# Patient Record
Sex: FEMALE | Race: WHITE | NOT HISPANIC OR LATINO | Employment: OTHER | URBAN - METROPOLITAN AREA
[De-identification: names, ages, dates, MRNs, and addresses within clinical notes are randomized per-mention and may not be internally consistent; named-entity substitution may affect disease eponyms.]

---

## 2021-03-03 ENCOUNTER — TRANSCRIBE ORDERS (OUTPATIENT)
Dept: NEUROSURGERY | Facility: CLINIC | Age: 76
End: 2021-03-03

## 2021-03-03 DIAGNOSIS — G89.4 CHRONIC PAIN SYNDROME: Primary | ICD-10-CM

## 2021-03-05 ENCOUNTER — TELEPHONE (OUTPATIENT)
Dept: NEUROSURGERY | Facility: CLINIC | Age: 76
End: 2021-03-05

## 2021-03-05 NOTE — TELEPHONE ENCOUNTER
Patient called nurseline with questions regarding her upcoming appt  Returned call to patient and verified date, time and location of next appt  Patient appreciative of call back

## 2021-03-29 ENCOUNTER — CONSULT (OUTPATIENT)
Dept: NEUROSURGERY | Facility: CLINIC | Age: 76
End: 2021-03-29
Payer: MEDICARE

## 2021-03-29 ENCOUNTER — TELEPHONE (OUTPATIENT)
Dept: NEUROSURGERY | Facility: CLINIC | Age: 76
End: 2021-03-29

## 2021-03-29 VITALS
DIASTOLIC BLOOD PRESSURE: 58 MMHG | HEIGHT: 62 IN | SYSTOLIC BLOOD PRESSURE: 112 MMHG | WEIGHT: 145 LBS | BODY MASS INDEX: 26.68 KG/M2 | TEMPERATURE: 97.6 F | RESPIRATION RATE: 16 BRPM | HEART RATE: 76 BPM

## 2021-03-29 DIAGNOSIS — G89.4 CHRONIC PAIN SYNDROME: Primary | ICD-10-CM

## 2021-03-29 DIAGNOSIS — M96.1 POSTLAMINECTOMY SYNDROME: ICD-10-CM

## 2021-03-29 DIAGNOSIS — M54.16 LUMBAR RADICULOPATHY: ICD-10-CM

## 2021-03-29 PROCEDURE — 99204 OFFICE O/P NEW MOD 45 MIN: CPT | Performed by: NEUROLOGICAL SURGERY

## 2021-03-29 RX ORDER — ATORVASTATIN CALCIUM 80 MG/1
TABLET, FILM COATED ORAL
COMMUNITY
Start: 2020-10-02

## 2021-03-29 RX ORDER — OXYCODONE HYDROCHLORIDE 10 MG/1
TABLET ORAL
COMMUNITY
End: 2021-04-07 | Stop reason: ALTCHOICE

## 2021-03-29 RX ORDER — TRAZODONE HYDROCHLORIDE 50 MG/1
50 TABLET ORAL
COMMUNITY
Start: 2021-03-05 | End: 2021-05-04

## 2021-03-29 RX ORDER — EZETIMIBE 10 MG/1
TABLET ORAL
COMMUNITY
Start: 2020-10-09

## 2021-03-29 RX ORDER — GABAPENTIN 800 MG/1
800 TABLET ORAL 3 TIMES DAILY
COMMUNITY
Start: 2021-01-20

## 2021-03-29 RX ORDER — HYDROCODONE BITARTRATE AND ACETAMINOPHEN 5; 325 MG/1; MG/1
1-2 TABLET ORAL EVERY 6 HOURS PRN
COMMUNITY
Start: 2021-02-03 | End: 2021-04-07 | Stop reason: ALTCHOICE

## 2021-03-29 RX ORDER — NITROGLYCERIN 0.4 MG/1
TABLET SUBLINGUAL
COMMUNITY

## 2021-03-29 RX ORDER — ZOLPIDEM TARTRATE 10 MG/1
10 TABLET ORAL
COMMUNITY

## 2021-03-29 RX ORDER — COVID-19 ANTIGEN TEST
220 KIT MISCELLANEOUS
COMMUNITY
End: 2021-04-20 | Stop reason: HOSPADM

## 2021-03-29 RX ORDER — CLOPIDOGREL BISULFATE 75 MG/1
TABLET ORAL
COMMUNITY
End: 2021-04-07 | Stop reason: ALTCHOICE

## 2021-03-29 RX ORDER — CHLORHEXIDINE GLUCONATE 0.12 MG/ML
15 RINSE ORAL ONCE
Status: CANCELLED | OUTPATIENT
Start: 2021-03-29 | End: 2021-03-29

## 2021-03-29 RX ORDER — CEFAZOLIN SODIUM 2 G/50ML
2000 SOLUTION INTRAVENOUS ONCE
Status: CANCELLED | OUTPATIENT
Start: 2021-04-20 | End: 2021-03-29

## 2021-03-29 RX ORDER — PAROXETINE 30 MG/1
30 TABLET, FILM COATED ORAL DAILY
COMMUNITY
Start: 2021-02-17

## 2021-03-29 RX ORDER — LANOLIN ALCOHOL/MO/W.PET/CERES
CREAM (GRAM) TOPICAL
COMMUNITY

## 2021-03-29 RX ORDER — WARFARIN SODIUM 5 MG/1
TABLET ORAL
COMMUNITY
Start: 2021-02-17 | End: 2021-04-20 | Stop reason: HOSPADM

## 2021-03-29 RX ORDER — DOCUSATE SODIUM 100 MG/1
CAPSULE, LIQUID FILLED ORAL
COMMUNITY
End: 2021-04-07 | Stop reason: ALTCHOICE

## 2021-03-29 NOTE — H&P (VIEW-ONLY)
Assessment/Plan:    No problem-specific Assessment & Plan notes found for this encounter  Patient is stable  Symptoms, as detailed in HPI, continue to significantly impact of patient's quality of life in daily activities  After carefully considering presentation, investigations, functional status and co-morbidities, the risk/benefit profile of surgical intervention is favorable  History, physical examination and diagnostic tests were reviewed and questions answered  Diagnosis, care plan and treatment options were discussed  The patient understand instructions and will follow up as directed  Patient with chronic low back pain  She had a successful trial with > 50% relief of chronic low back pain  She wishes to proceed with implantation  Expected postoperative course, including activity restrictions, expected pain and postoperative medication were reviewed  Patient provided verbal consent to surgical procedure and signed consent form: Yes    We also discussed the risks and benefits of the procedure the risks being including but not limited too: infection (~2%), neurologic injury (<1%), new pain, revisions surgery, failure to relieve pain, hardware issues  The benefits including relief of pain as in trial  The patient stated understanding of the risks and benefits and agreed to proceed          Diagnoses and all orders for this visit:    Chronic pain syndrome  -     Ambulatory referral to Neurosurgery  -     Case request operating room: [address], LEFT FLANK; Standing  -     Case request operating room: INSERTION THORACIC DORSAL COLUMN SPINAL CORD STIMULATOR PERCUTANEOUS W IMPLANTABLE PULSE GENERATOR, LEFT FLANK  -     MRI thoracic spine without contrast; Future    Postlaminectomy syndrome  -     [facility], [facility]; Standing  -     Case request operating room: INSERTION THORACIC DORSAL COLUMN SPINAL CORD STIMULATOR PERCUTANEOUS W IMPLANTABLE PULSE GENERATOR, LEFT FLANK  -     MRI thoracic spine without contrast; Future    Lumbar radiculopathy  -     Case request operating room: INSERTION THORACIC DORSAL COLUMN SPINAL CORD STIMULATOR PERCUTANEOUS W IMPLANTABLE PULSE GENERATOR, LEFT FLANK; Standing  -     Case request operating room: INSERTION THORACIC DORSAL COLUMN SPINAL CORD STIMULATOR PERCUTANEOUS W IMPLANTABLE PULSE GENERATOR, LEFT FLANK  -     MRI thoracic spine without contrast; Future    Other orders  -     atorvastatin (LIPITOR) 80 mg tablet; TAKE 1 TABLET NIGHTLY  -     calcium citrate-vitamin D (CITRACAL+D) 315-200 MG-UNIT per tablet; 1 tablet 2x daily  -     clopidogrel (PLAVIX) 75 mg tablet  -     Diclofenac Sodium (VOLTAREN) 1 %; Apply 1 application topically 4 (four) times a day  -     docusate sodium (Colace) 100 mg capsule  -     ezetimibe (ZETIA) 10 mg tablet; TAKE 1 TABLET DAILY  -     gabapentin (NEURONTIN) 800 mg tablet; Take 800 mg by mouth 3 (three) times a day  -     HYDROcodone-acetaminophen (NORCO) 5-325 mg per tablet; Take 1-2 tablets by mouth every 6 (six) hours as needed  -     metoprolol tartrate (LOPRESSOR) 25 mg tablet  -     Multiple Vitamins-Iron (Multi-Vitamin/Iron) TABS; Take by mouth  -     Naproxen Sodium 220 MG CAPS; Take 220 mg by mouth  -     nitroglycerin (Nitrostat) 0 4 mg SL tablet  -     oxyCODONE (ROXICODONE) 10 MG TABS  -     PARoxetine (PAXIL) 30 mg tablet; Take 30 mg by mouth daily  -     traZODone (DESYREL) 50 mg tablet; Take 50 mg by mouth  -     warfarin (COUMADIN) 5 mg tablet; AT THE START OF THERAPY, TAKE 1 TABLET DAILY IN THE EVENING AND HAVE INR DONE WEEKLY FOR FOUR WEEKS POINT OF CARE AND THEN MONTHLY    -     zolpidem (Ambien) 10 mg tablet  -     Diet NPO; Sips with meds; Standing  -     Nursing Communication [address] [place] [address] Interventions Implemented; Standing  -     Nursing Communication CHG bath, have staff wash entire body (neck down) per pre-op bathing protocol  Routine, evening prior to, and day of surgery ; Standing  -     Nursing Communication Swab both nares with Povidone-Iodine solution, EXCLUDE if patient has shellfish/Iodine allergy  Routine, day of surgery, on call to OR; Standing  -     chlorhexidine (PERIDEX) 0 12 % oral rinse 15 mL  -     Void on call to OR; Standing  -     Insert peripheral IV; Standing  -     ceFAZolin (ANCEF) IVPB (premix in dextrose) 2,000 mg 50 mL        I have spent 60 minutes with Patient  today in which greater than 50% of this time was spent in counseling/coordination of care regarding Diagnostic results, Prognosis, Risks and benefits of tx options, Intructions for management, Patient and family education, Importance of tx compliance, Risk factor reductions and Impressions  Subjective:      Patient ID: Shobha Hdz is a 70 y o  female  Patient is a 70year old female with symptoms of low back pain  Pain is severe and long standing  She has failed conservative therapies  She has had multiple surgeries and injections without relief  She underwent a successful medtronic thoracic trial with greater than 50 % relief of pain  They reported improvement in activity level  They are ready to proceed with surgery  The following portions of the patient's history were reviewed and updated as appropriate: She  has no past medical history on file  She There are no active problems to display for this patient  She  has no past surgical history on file  Her family history is not on file  She  reports that she has never smoked  She has never used smokeless tobacco  She reports previous alcohol use  She reports that she does not use drugs    Current Outpatient Medications   Medication Sig Dispense Refill    atorvastatin (LIPITOR) 80 mg tablet TAKE 1 TABLET NIGHTLY      Diclofenac Sodium (VOLTAREN) 1 % Apply 1 application topically 4 (four) times a day      ezetimibe (ZETIA) 10 mg tablet TAKE 1 TABLET DAILY      HYDROcodone-acetaminophen (NORCO) 5-325 mg per tablet Take 1-2 tablets by mouth every 6 (six) hours as needed      metoprolol tartrate (LOPRESSOR) 25 mg tablet       PARoxetine (PAXIL) 30 mg tablet Take 30 mg by mouth daily      traZODone (DESYREL) 50 mg tablet Take 50 mg by mouth      warfarin (COUMADIN) 5 mg tablet AT THE START OF THERAPY, TAKE 1 TABLET DAILY IN THE EVENING AND HAVE INR DONE WEEKLY FOR FOUR WEEKS POINT OF CARE AND THEN MONTHLY   calcium citrate-vitamin D (CITRACAL+D) 315-200 MG-UNIT per tablet 1 tablet 2x daily      clopidogrel (PLAVIX) 75 mg tablet       docusate sodium (Colace) 100 mg capsule       gabapentin (NEURONTIN) 800 mg tablet Take 800 mg by mouth 3 (three) times a day      Multiple Vitamins-Iron (Multi-Vitamin/Iron) TABS Take by mouth      Naproxen Sodium 220 MG CAPS Take 220 mg by mouth      nitroglycerin (Nitrostat) 0 4 mg SL tablet       oxyCODONE (ROXICODONE) 10 MG TABS       zolpidem (Ambien) 10 mg tablet        No current facility-administered medications for this visit  Current Outpatient Medications on File Prior to Visit   Medication Sig    atorvastatin (LIPITOR) 80 mg tablet TAKE 1 TABLET NIGHTLY    Diclofenac Sodium (VOLTAREN) 1 % Apply 1 application topically 4 (four) times a day    ezetimibe (ZETIA) 10 mg tablet TAKE 1 TABLET DAILY    HYDROcodone-acetaminophen (NORCO) 5-325 mg per tablet Take 1-2 tablets by mouth every 6 (six) hours as needed    metoprolol tartrate (LOPRESSOR) 25 mg tablet     PARoxetine (PAXIL) 30 mg tablet Take 30 mg by mouth daily    traZODone (DESYREL) 50 mg tablet Take 50 mg by mouth    warfarin (COUMADIN) 5 mg tablet AT THE START OF THERAPY, TAKE 1 TABLET DAILY IN THE EVENING AND HAVE INR DONE WEEKLY FOR FOUR WEEKS POINT OF CARE AND THEN MONTHLY      calcium citrate-vitamin D (CITRACAL+D) 315-200 MG-UNIT per tablet 1 tablet 2x daily    clopidogrel (PLAVIX) 75 mg tablet     docusate sodium (Colace) 100 mg capsule     gabapentin (NEURONTIN) 800 mg tablet Take 800 mg by mouth 3 (three) times a day    Multiple Vitamins-Iron (Multi-Vitamin/Iron) TABS Take by mouth    Naproxen Sodium 220 MG CAPS Take 220 mg by mouth    nitroglycerin (Nitrostat) 0 4 mg SL tablet     oxyCODONE (ROXICODONE) 10 MG TABS     zolpidem (Ambien) 10 mg tablet      No current facility-administered medications on file prior to visit  She has No Known Allergies       Review of Systems   Constitutional: Negative  HENT: Negative  Eyes: Negative  Respiratory: Negative  Cardiovascular:        Hx of cardiac stents, 2  Open heart surgery   Gastrointestinal: Negative  Genitourinary: Negative  Musculoskeletal: Positive for back pain (LBP across the waist) and gait problem (walker)  Hx of lumbar fusion   Skin: Negative  Allergic/Immunologic: Negative  Hematological: Bruises/bleeds easily (warfarin, plavix, asa, fish oil)  Psychiatric/Behavioral: Negative  I have personally reviewed all aspects of the review of systems as documented    Objective:      /58 (BP Location: Left arm)   Pulse 76   Temp 97 6 °F (36 4 °C) (Tympanic)   Resp 16   Ht 5' 2" (1 575 m)   Wt 65 8 kg (145 lb)   BMI 26 52 kg/m²          Physical Exam  Constitutional:       General: She is not in acute distress  Appearance: Normal appearance  She is normal weight  She is not ill-appearing  HENT:      Head: Normocephalic and atraumatic  Eyes:      General:         Right eye: No discharge  Left eye: No discharge  Extraocular Movements: Extraocular movements intact  Conjunctiva/sclera: Conjunctivae normal       Pupils: Pupils are equal, round, and reactive to light  Cardiovascular:      Rate and Rhythm: Normal rate and regular rhythm  Pulmonary:      Effort: Pulmonary effort is normal  No respiratory distress  Skin:     General: Skin is warm and dry  Neurological:      General: No focal deficit present  Mental Status: She is alert and oriented to person, place, and time  Mental status is at baseline  Cranial Nerves: Cranial nerves are intact  Sensory: Sensation is intact  Motor: Motor function is intact  Psychiatric:         Mood and Affect: Mood normal          Thought Content:  Thought content normal

## 2021-03-29 NOTE — PROGRESS NOTES
Assessment/Plan:    No problem-specific Assessment & Plan notes found for this encounter  Patient is stable  Symptoms, as detailed in HPI, continue to significantly impact of patient's quality of life in daily activities  After carefully considering presentation, investigations, functional status and co-morbidities, the risk/benefit profile of surgical intervention is favorable  History, physical examination and diagnostic tests were reviewed and questions answered  Diagnosis, care plan and treatment options were discussed  The patient understand instructions and will follow up as directed  Patient with chronic low back pain  She had a successful trial with > 50% relief of chronic low back pain  She wishes to proceed with implantation  Expected postoperative course, including activity restrictions, expected pain and postoperative medication were reviewed  Patient provided verbal consent to surgical procedure and signed consent form: Yes    We also discussed the risks and benefits of the procedure the risks being including but not limited too: infection (~2%), neurologic injury (<1%), new pain, revisions surgery, failure to relieve pain, hardware issues  The benefits including relief of pain as in trial  The patient stated understanding of the risks and benefits and agreed to proceed          Diagnoses and all orders for this visit:    Chronic pain syndrome  -     Ambulatory referral to Neurosurgery  -     Case request operating room: [address], LEFT FLANK; Standing  -     Case request operating room: INSERTION THORACIC DORSAL COLUMN SPINAL CORD STIMULATOR PERCUTANEOUS W IMPLANTABLE PULSE GENERATOR, LEFT FLANK  -     MRI thoracic spine without contrast; Future    Postlaminectomy syndrome  -     [facility], LEFT FLANK; Standing  -     Case request operating room: INSERTION THORACIC DORSAL COLUMN SPINAL CORD STIMULATOR PERCUTANEOUS W IMPLANTABLE PULSE GENERATOR, LEFT FLANK  -     MRI thoracic spine without contrast; Future    Lumbar radiculopathy  -     Case request operating room: INSERTION THORACIC DORSAL COLUMN SPINAL CORD STIMULATOR PERCUTANEOUS W IMPLANTABLE PULSE GENERATOR, LEFT FLANK; Standing  -     Case request operating room: INSERTION THORACIC DORSAL COLUMN SPINAL CORD STIMULATOR PERCUTANEOUS W IMPLANTABLE PULSE GENERATOR, LEFT FLANK  -     MRI thoracic spine without contrast; Future    Other orders  -     atorvastatin (LIPITOR) 80 mg tablet; TAKE 1 TABLET NIGHTLY  -     calcium citrate-vitamin D (CITRACAL+D) 315-200 MG-UNIT per tablet; 1 tablet 2x daily  -     clopidogrel (PLAVIX) 75 mg tablet  -     Diclofenac Sodium (VOLTAREN) 1 %; Apply 1 application topically 4 (four) times a day  -     docusate sodium (Colace) 100 mg capsule  -     ezetimibe (ZETIA) 10 mg tablet; TAKE 1 TABLET DAILY  -     gabapentin (NEURONTIN) 800 mg tablet; Take 800 mg by mouth 3 (three) times a day  -     HYDROcodone-acetaminophen (NORCO) 5-325 mg per tablet; Take 1-2 tablets by mouth every 6 (six) hours as needed  -     metoprolol tartrate (LOPRESSOR) 25 mg tablet  -     Multiple Vitamins-Iron (Multi-Vitamin/Iron) TABS; Take by mouth  -     Naproxen Sodium 220 MG CAPS; Take 220 mg by mouth  -     nitroglycerin (Nitrostat) 0 4 mg SL tablet  -     oxyCODONE (ROXICODONE) 10 MG TABS  -     PARoxetine (PAXIL) 30 mg tablet; Take 30 mg by mouth daily  -     traZODone (DESYREL) 50 mg tablet; Take 50 mg by mouth  -     warfarin (COUMADIN) 5 mg tablet; AT THE START OF THERAPY, TAKE 1 TABLET DAILY IN THE EVENING AND HAVE INR DONE WEEKLY FOR FOUR WEEKS POINT OF CARE AND THEN MONTHLY    -     zolpidem (Ambien) 10 mg tablet  -     Diet NPO; Sips with meds; Standing  -     Nursing Communication [address] [place] [address] Interventions Implemented; Standing  -     Nursing Communication CHG bath, have staff wash entire body (neck down) per pre-op bathing protocol  Routine, evening prior to, and day of surgery ; Standing  -     Nursing Communication Swab both nares with Povidone-Iodine solution, EXCLUDE if patient has shellfish/Iodine allergy  Routine, day of surgery, on call to OR; Standing  -     chlorhexidine (PERIDEX) 0 12 % oral rinse 15 mL  -     Void on call to OR; Standing  -     Insert peripheral IV; Standing  -     ceFAZolin (ANCEF) IVPB (premix in dextrose) 2,000 mg 50 mL        I have spent 60 minutes with Patient  today in which greater than 50% of this time was spent in counseling/coordination of care regarding Diagnostic results, Prognosis, Risks and benefits of tx options, Intructions for management, Patient and family education, Importance of tx compliance, Risk factor reductions and Impressions  Subjective:      Patient ID: Angelique Tate is a 70 y o  female  Patient is a 70year old female with symptoms of low back pain  Pain is severe and long standing  She has failed conservative therapies  She has had multiple surgeries and injections without relief  She underwent a successful medtronic thoracic trial with greater than 50 % relief of pain  They reported improvement in activity level  They are ready to proceed with surgery  The following portions of the patient's history were reviewed and updated as appropriate: She  has no past medical history on file  She There are no active problems to display for this patient  She  has no past surgical history on file  Her family history is not on file  She  reports that she has never smoked  She has never used smokeless tobacco  She reports previous alcohol use  She reports that she does not use drugs    Current Outpatient Medications   Medication Sig Dispense Refill    atorvastatin (LIPITOR) 80 mg tablet TAKE 1 TABLET NIGHTLY      Diclofenac Sodium (VOLTAREN) 1 % Apply 1 application topically 4 (four) times a day      ezetimibe (ZETIA) 10 mg tablet TAKE 1 TABLET DAILY      HYDROcodone-acetaminophen (NORCO) 5-325 mg per tablet Take 1-2 tablets by mouth every 6 (six) hours as needed      metoprolol tartrate (LOPRESSOR) 25 mg tablet       PARoxetine (PAXIL) 30 mg tablet Take 30 mg by mouth daily      traZODone (DESYREL) 50 mg tablet Take 50 mg by mouth      warfarin (COUMADIN) 5 mg tablet AT THE START OF THERAPY, TAKE 1 TABLET DAILY IN THE EVENING AND HAVE INR DONE WEEKLY FOR FOUR WEEKS POINT OF CARE AND THEN MONTHLY   calcium citrate-vitamin D (CITRACAL+D) 315-200 MG-UNIT per tablet 1 tablet 2x daily      clopidogrel (PLAVIX) 75 mg tablet       docusate sodium (Colace) 100 mg capsule       gabapentin (NEURONTIN) 800 mg tablet Take 800 mg by mouth 3 (three) times a day      Multiple Vitamins-Iron (Multi-Vitamin/Iron) TABS Take by mouth      Naproxen Sodium 220 MG CAPS Take 220 mg by mouth      nitroglycerin (Nitrostat) 0 4 mg SL tablet       oxyCODONE (ROXICODONE) 10 MG TABS       zolpidem (Ambien) 10 mg tablet        No current facility-administered medications for this visit  Current Outpatient Medications on File Prior to Visit   Medication Sig    atorvastatin (LIPITOR) 80 mg tablet TAKE 1 TABLET NIGHTLY    Diclofenac Sodium (VOLTAREN) 1 % Apply 1 application topically 4 (four) times a day    ezetimibe (ZETIA) 10 mg tablet TAKE 1 TABLET DAILY    HYDROcodone-acetaminophen (NORCO) 5-325 mg per tablet Take 1-2 tablets by mouth every 6 (six) hours as needed    metoprolol tartrate (LOPRESSOR) 25 mg tablet     PARoxetine (PAXIL) 30 mg tablet Take 30 mg by mouth daily    traZODone (DESYREL) 50 mg tablet Take 50 mg by mouth    warfarin (COUMADIN) 5 mg tablet AT THE START OF THERAPY, TAKE 1 TABLET DAILY IN THE EVENING AND HAVE INR DONE WEEKLY FOR FOUR WEEKS POINT OF CARE AND THEN MONTHLY      calcium citrate-vitamin D (CITRACAL+D) 315-200 MG-UNIT per tablet 1 tablet 2x daily    clopidogrel (PLAVIX) 75 mg tablet     docusate sodium (Colace) 100 mg capsule     gabapentin (NEURONTIN) 800 mg tablet Take 800 mg by mouth 3 (three) times a day    Multiple Vitamins-Iron (Multi-Vitamin/Iron) TABS Take by mouth    Naproxen Sodium 220 MG CAPS Take 220 mg by mouth    nitroglycerin (Nitrostat) 0 4 mg SL tablet     oxyCODONE (ROXICODONE) 10 MG TABS     zolpidem (Ambien) 10 mg tablet      No current facility-administered medications on file prior to visit  She has No Known Allergies       Review of Systems   Constitutional: Negative  HENT: Negative  Eyes: Negative  Respiratory: Negative  Cardiovascular:        Hx of cardiac stents, 2  Open heart surgery   Gastrointestinal: Negative  Genitourinary: Negative  Musculoskeletal: Positive for back pain (LBP across the waist) and gait problem (walker)  Hx of lumbar fusion   Skin: Negative  Allergic/Immunologic: Negative  Hematological: Bruises/bleeds easily (warfarin, plavix, asa, fish oil)  Psychiatric/Behavioral: Negative  I have personally reviewed all aspects of the review of systems as documented    Objective:      /58 (BP Location: Left arm)   Pulse 76   Temp 97 6 °F (36 4 °C) (Tympanic)   Resp 16   Ht 5' 2" (1 575 m)   Wt 65 8 kg (145 lb)   BMI 26 52 kg/m²          Physical Exam  Constitutional:       General: She is not in acute distress  Appearance: Normal appearance  She is normal weight  She is not ill-appearing  HENT:      Head: Normocephalic and atraumatic  Eyes:      General:         Right eye: No discharge  Left eye: No discharge  Extraocular Movements: Extraocular movements intact  Conjunctiva/sclera: Conjunctivae normal       Pupils: Pupils are equal, round, and reactive to light  Cardiovascular:      Rate and Rhythm: Normal rate and regular rhythm  Pulmonary:      Effort: Pulmonary effort is normal  No respiratory distress  Skin:     General: Skin is warm and dry  Neurological:      General: No focal deficit present  Mental Status: She is alert and oriented to person, place, and time  Mental status is at baseline  Cranial Nerves: Cranial nerves are intact  Sensory: Sensation is intact  Motor: Motor function is intact  Psychiatric:         Mood and Affect: Mood normal          Thought Content:  Thought content normal

## 2021-04-01 ENCOUNTER — TELEPHONE (OUTPATIENT)
Dept: OTHER | Facility: OTHER | Age: 76
End: 2021-04-01

## 2021-04-01 NOTE — TELEPHONE ENCOUNTER
Pt calling because she stated no one has called her about her upcoming procedure  She said she is supposed to have an insertation procedure done but no one has contacted her to set it up  Pt seems concerned  Pt would be appreciative of a call back

## 2021-04-02 LAB — HBA1C MFR BLD HPLC: 5.7 %

## 2021-04-05 ENCOUNTER — TELEPHONE (OUTPATIENT)
Dept: NEUROSURGERY | Facility: CLINIC | Age: 76
End: 2021-04-05

## 2021-04-05 NOTE — TELEPHONE ENCOUNTER
Called the patient regarding MRI pt stated she already had one completed recently  Informed pt need the disc with the images  Pt will request to have disc's mailed

## 2021-04-05 NOTE — TELEPHONE ENCOUNTER
Pt called back stating that where she completed the MRI needed a request faxed to [phone]  The request was faxed in today to have disc mailed

## 2021-04-07 ENCOUNTER — TELEPHONE (OUTPATIENT)
Dept: NEUROSURGERY | Facility: CLINIC | Age: 76
End: 2021-04-07

## 2021-04-07 RX ORDER — ACETAMINOPHEN 500 MG
500 TABLET ORAL EVERY 4 HOURS PRN
COMMUNITY

## 2021-04-07 RX ORDER — ASCORBIC ACID 500 MG
500 TABLET ORAL DAILY
COMMUNITY

## 2021-04-07 RX ORDER — DOCUSATE SODIUM 100 MG/1
100 CAPSULE, LIQUID FILLED ORAL 2 TIMES DAILY
COMMUNITY

## 2021-04-07 RX ORDER — OXYCODONE AND ACETAMINOPHEN 10; 325 MG/1; MG/1
1 TABLET ORAL 3 TIMES DAILY PRN
COMMUNITY
End: 2021-04-20 | Stop reason: DRUGHIGH

## 2021-04-07 RX ORDER — ASPIRIN 81 MG/1
81 TABLET ORAL DAILY
COMMUNITY
End: 2021-04-20 | Stop reason: HOSPADM

## 2021-04-07 NOTE — TELEPHONE ENCOUNTER
Returned call to patient she questions what medications she can hold before surgery   Medication leit reviewed , several medications are not listed in chart , medication list    A review of recent cardiologist visit note and med list included medication not on current med list   Med list updated     Reviewed hold medications vitamins and dietary supplements and ASA 7/14 days preop/postop hold  Warfarin hold 7/7 preop and postop    Has history of atrial fib , previous assessment via loop recorder      EKG at Cardiologist /Med Dr Trude [place] 4/2/2021 INTERPRETATION:   Atrial fibrillation  Right bundle branch block     HO ---69 y/o F with pmhx of A fib, CHF on lasix, prior CAD with NSTEMI and CABG    Scheduled sx w/ Victor Manuel Campbell 4/20/2021  INSERTION THORACIC DORSAL COLUMN SPINAL CORD STIMULATOR PERCUTANEOUS W IMPLANTABLE PULSE GENERATOR, LEFT FLANK (Left Spine Thoracic)      PLAN;  Discussed with surgery scheduler needd clearance request to cardiologist /PCP form consensus to hold ASA and warfarin  Surgery scheduler will send

## 2021-04-16 NOTE — PRE-PROCEDURE INSTRUCTIONS
Pre-Surgery Instructions:   Medication Instructions    acetaminophen (TYLENOL) 500 mg tablet Instructed patient per Anesthesia Guidelines  prn    ascorbic acid (VITAMIN C) 500 MG tablet Patient was instructed by Physician and understands   aspirin (ECOTRIN LOW STRENGTH) 81 mg EC tablet Patient was instructed by Physician and understands   atorvastatin (LIPITOR) 80 mg tablet Instructed patient per Anesthesia Guidelines  takes in pm    calcium citrate-vitamin D (CITRACAL+D) 315-200 MG-UNIT per tablet Patient was instructed by Physician and understands   co-enzyme Q-10 30 MG capsule Patient was instructed by Physician and understands   docusate sodium (COLACE) 100 mg capsule Instructed patient per Anesthesia Guidelines  may take    ezetimibe (ZETIA) 10 mg tablet Instructed patient per Anesthesia Guidelines  takes in pm    gabapentin (NEURONTIN) 800 mg tablet Instructed patient per Anesthesia Guidelines  take am of sx    metoprolol tartrate (LOPRESSOR) 25 mg tablet Instructed patient per Anesthesia Guidelines  take am of sx    Multiple Vitamins-Iron (Multi-Vitamin/Iron) TABS Patient was instructed by Physician and understands   nitroglycerin (Nitrostat) 0 4 mg SL tablet Instructed patient per Anesthesia Guidelines   oxyCODONE-acetaminophen (PERCOCET)  mg per tablet Instructed patient per Anesthesia Guidelines  may take    PARoxetine (PAXIL) 30 mg tablet Instructed patient per Anesthesia Guidelines  take am of sx    traZODone (DESYREL) 50 mg tablet Instructed patient per Anesthesia Guidelines  takes hs    warfarin (COUMADIN) 5 mg tablet Patient was instructed by Physician and understands   zolpidem (Ambien) 10 mg tablet Instructed patient per Anesthesia Guidelines  takes hs    You will receive a phone call from hospital for arrival time  Please call surgeons office if any changes in your condition    Wear easy on/off clothing; consider type of surgery;  Valuables, jewelry, piercing's please keep at home  **COVID-19  education/surgical guidelines      Please: No contact lenses or eye make up, artificial eyelashes    Please secure transportation     Follow pre surgery showering or cleaning instructions as  Reviewed by nurse or surgeons office      Questions answered and concerns addressed

## 2021-04-19 ENCOUNTER — DOCUMENTATION (OUTPATIENT)
Dept: NEUROSURGERY | Facility: CLINIC | Age: 76
End: 2021-04-19

## 2021-04-19 ENCOUNTER — TELEPHONE (OUTPATIENT)
Dept: NEUROSURGERY | Facility: CLINIC | Age: 76
End: 2021-04-19

## 2021-04-19 ENCOUNTER — ANESTHESIA EVENT (OUTPATIENT)
Dept: PERIOP | Facility: HOSPITAL | Age: 76
End: 2021-04-19
Payer: MEDICARE

## 2021-04-19 DIAGNOSIS — Z79.2 PROPHYLACTIC ANTIBIOTIC: Primary | ICD-10-CM

## 2021-04-19 DIAGNOSIS — Z98.890 POSTOPERATIVE STATE: ICD-10-CM

## 2021-04-19 RX ORDER — DOXYCYCLINE HYCLATE 100 MG/1
100 CAPSULE ORAL EVERY 12 HOURS SCHEDULED
Qty: 6 CAPSULE | Refills: 0 | Status: SHIPPED | OUTPATIENT
Start: 2021-04-19 | End: 2021-04-22

## 2021-04-19 NOTE — TELEPHONE ENCOUNTER
[address] [facility] to discuss coumadin drug interaction with doxycycline  Spoke with Chago Alvarenga that we are aware of the drug interaction and have considered this before sending the order  Explained to him that the patient discontinued her coumadin in preparation for her surgery and will remain off of this until 4/27 so these medication will not be taken together  Prudence Ice stated an understanding and was appreciative

## 2021-04-19 NOTE — TELEPHONE ENCOUNTER
Pre-operative call day prior surgery scheduled in the AM w/ Dr Audra Edwards, LEFT FLANK (Left Spine Thoracic)---4/20/21    Discussion/Review       Allergies ---    Hold medications --- Reviewed  Reviewed  Reviewed hold medications vitamins and dietary supplements and ASA 7/14 days preop/postop hold  --hold thru 5/4 2 wk po visit   Warfarin hold 7/7 preop and postop  Has history of atrial fib , previous assessment via loop recorder  Hold start 4/16   Thru  4/27 restart 4/28      NPO after MN, night prior surgery ---Reviewed as instructed by ASU nurse     Medication (s) instructed by healthcare provider to take the morning of surgery w/ sip of water 4 OZ discussed: as instructed by ASU nurse     Post operative antibiotic electronic transmission to pharmacy:  Doxycycline     PDMP site reviewed accessed and reviewed scheduled drug list printed and scanned into record     Pain management script: Dr Nayeli Ojeda opiate prescribed --has percocet 10/325 supply filled 90 tabs  3/24/21  --one  TID ---AFTER surgery use current supply ,  for 3-5 days take one tab every 4-6 hours as needed for pain , NSX will refill first order as needed to resume preoperative dosing     Pre- operative shower protocol reviewed; Clarify instructions as per protocol, third chlorhexidine shower tonight before surgery, then use SHARI wipes as per packaging instructions, Use a clean towel and wash cloth starting tonight and continue nightly until seen 2 weeks post-operative visit for incision check removal  Change bed linens tonight and continue at least 1-2 times weekly  Informed will receive a telephone call tonight from a hospital representative with time to report on surgery day:  pending    Informed will receive a f/u call within in 24 -48 hours post-op to assess recovery reinforce instructions, and to answer any questions          Follow-up appointments reviewed: documented in discharge paper work 2 week      6 week     5/4/2021 10:30 AM (Arrive by 10:15 AM) Santhosh Galvez, [address] [facility]   6/3/2021 10:30 AM (Arrive by 10:15 AM) Kailey Bowers MD [place] [id number] [facility]       Patient verbalized understanding information provided /discussed  Call product rep 1-2 days weeks prior postop appointment with reminder of your appointment   [employer]  Reviewed hold medications vitamins and dietary supplements and ASA 7/14 days preop/postop hold  Warfarin hold 7/7 preop and postop   Has history of atrial fib , previous assessment via loop recorder

## 2021-04-20 ENCOUNTER — ANESTHESIA (OUTPATIENT)
Dept: PERIOP | Facility: HOSPITAL | Age: 76
End: 2021-04-20
Payer: MEDICARE

## 2021-04-20 ENCOUNTER — TELEPHONE (OUTPATIENT)
Dept: NEUROSURGERY | Facility: CLINIC | Age: 76
End: 2021-04-20

## 2021-04-20 ENCOUNTER — APPOINTMENT (OUTPATIENT)
Dept: RADIOLOGY | Facility: HOSPITAL | Age: 76
End: 2021-04-20
Payer: MEDICARE

## 2021-04-20 ENCOUNTER — HOSPITAL ENCOUNTER (OUTPATIENT)
Facility: HOSPITAL | Age: 76
Setting detail: OUTPATIENT SURGERY
Discharge: HOME | End: 2021-04-20
Attending: NEUROLOGICAL SURGERY | Admitting: NEUROLOGICAL SURGERY
Payer: MEDICARE

## 2021-04-20 VITALS
HEART RATE: 72 BPM | OXYGEN SATURATION: 98 % | TEMPERATURE: 96.8 F | BODY MASS INDEX: 25.1 KG/M2 | RESPIRATION RATE: 20 BRPM | DIASTOLIC BLOOD PRESSURE: 66 MMHG | HEIGHT: 62 IN | WEIGHT: 136.4 LBS | SYSTOLIC BLOOD PRESSURE: 153 MMHG

## 2021-04-20 DIAGNOSIS — T40.2X5A CONSTIPATION DUE TO OPIOID THERAPY: ICD-10-CM

## 2021-04-20 DIAGNOSIS — G89.18 POSTOPERATIVE PAIN AFTER SPINAL SURGERY: ICD-10-CM

## 2021-04-20 DIAGNOSIS — F11.90 OPIATE USE: Primary | ICD-10-CM

## 2021-04-20 DIAGNOSIS — K59.03 CONSTIPATION DUE TO OPIOID THERAPY: ICD-10-CM

## 2021-04-20 DIAGNOSIS — Z98.890 POSTOPERATIVE STATE: ICD-10-CM

## 2021-04-20 PROBLEM — G89.4 CHRONIC PAIN SYNDROME: Status: ACTIVE | Noted: 2021-04-20

## 2021-04-20 LAB
APTT PPP: 31 SECONDS (ref 23–37)
INR PPP: 1.22 (ref 0.84–1.19)
PROTHROMBIN TIME: 15.4 SECONDS (ref 11.6–14.5)

## 2021-04-20 PROCEDURE — 63685 INS/RPLC SPI NPG/RCVR POCKET: CPT | Performed by: ANESTHESIOLOGY

## 2021-04-20 PROCEDURE — C1820 GENERATOR NEURO RECHG BAT SY: HCPCS | Performed by: NEUROLOGICAL SURGERY

## 2021-04-20 PROCEDURE — 85730 THROMBOPLASTIN TIME PARTIAL: CPT | Performed by: NEUROLOGICAL SURGERY

## 2021-04-20 PROCEDURE — C1787 PATIENT PROGR, NEUROSTIM: HCPCS | Performed by: NEUROLOGICAL SURGERY

## 2021-04-20 PROCEDURE — 85610 PROTHROMBIN TIME: CPT | Performed by: NEUROLOGICAL SURGERY

## 2021-04-20 PROCEDURE — 63685 INS/RPLC SPI NPG/RCVR POCKET: CPT | Performed by: NEUROLOGICAL SURGERY

## 2021-04-20 PROCEDURE — 95972 ALYS CPLX SP/PN NPGT W/PRGRM: CPT | Performed by: NEUROLOGICAL SURGERY

## 2021-04-20 PROCEDURE — C1778 LEAD, NEUROSTIMULATOR: HCPCS | Performed by: NEUROLOGICAL SURGERY

## 2021-04-20 PROCEDURE — 63650 IMPLANT NEUROELECTRODES: CPT | Performed by: NEUROLOGICAL SURGERY

## 2021-04-20 PROCEDURE — 72070 X-RAY EXAM THORAC SPINE 2VWS: CPT

## 2021-04-20 DEVICE — NEUROSTIM INTELLIS SURESCAN MRI W/ ADAPTIVESTIM: Type: IMPLANTABLE DEVICE | Status: FUNCTIONAL

## 2021-04-20 DEVICE — LEAD KIT 8 CONTACT SCS MRI: Type: IMPLANTABLE DEVICE | Status: FUNCTIONAL

## 2021-04-20 RX ORDER — NALOXONE HYDROCHLORIDE 4 MG/.1ML
SPRAY NASAL
Qty: 1 EACH | Refills: 1 | Status: SHIPPED | OUTPATIENT
Start: 2021-04-20

## 2021-04-20 RX ORDER — ROCURONIUM BROMIDE 10 MG/ML
INJECTION, SOLUTION INTRAVENOUS AS NEEDED
Status: DISCONTINUED | OUTPATIENT
Start: 2021-04-20 | End: 2021-04-20

## 2021-04-20 RX ORDER — CEFAZOLIN SODIUM 2 G/50ML
2000 SOLUTION INTRAVENOUS ONCE
Status: COMPLETED | OUTPATIENT
Start: 2021-04-20 | End: 2021-04-20

## 2021-04-20 RX ORDER — PROPOFOL 10 MG/ML
INJECTION, EMULSION INTRAVENOUS CONTINUOUS PRN
Status: DISCONTINUED | OUTPATIENT
Start: 2021-04-20 | End: 2021-04-20

## 2021-04-20 RX ORDER — OXYCODONE HYDROCHLORIDE 5 MG/1
5 TABLET ORAL EVERY 4 HOURS PRN
Status: COMPLETED | OUTPATIENT
Start: 2021-04-20 | End: 2021-04-20

## 2021-04-20 RX ORDER — ONDANSETRON 2 MG/ML
4 INJECTION INTRAMUSCULAR; INTRAVENOUS ONCE AS NEEDED
Status: DISCONTINUED | OUTPATIENT
Start: 2021-04-20 | End: 2021-04-20 | Stop reason: HOSPADM

## 2021-04-20 RX ORDER — METOCLOPRAMIDE HYDROCHLORIDE 5 MG/ML
10 INJECTION INTRAMUSCULAR; INTRAVENOUS ONCE AS NEEDED
Status: DISCONTINUED | OUTPATIENT
Start: 2021-04-20 | End: 2021-04-20 | Stop reason: HOSPADM

## 2021-04-20 RX ORDER — CHLORHEXIDINE GLUCONATE 0.12 MG/ML
15 RINSE ORAL ONCE
Status: COMPLETED | OUTPATIENT
Start: 2021-04-20 | End: 2021-04-20

## 2021-04-20 RX ORDER — FENTANYL CITRATE 50 UG/ML
INJECTION, SOLUTION INTRAMUSCULAR; INTRAVENOUS AS NEEDED
Status: DISCONTINUED | OUTPATIENT
Start: 2021-04-20 | End: 2021-04-20

## 2021-04-20 RX ORDER — MEPERIDINE HYDROCHLORIDE 25 MG/ML
12.5 INJECTION INTRAMUSCULAR; INTRAVENOUS; SUBCUTANEOUS
Status: DISCONTINUED | OUTPATIENT
Start: 2021-04-20 | End: 2021-04-20 | Stop reason: HOSPADM

## 2021-04-20 RX ORDER — ONDANSETRON 2 MG/ML
INJECTION INTRAMUSCULAR; INTRAVENOUS AS NEEDED
Status: DISCONTINUED | OUTPATIENT
Start: 2021-04-20 | End: 2021-04-20

## 2021-04-20 RX ORDER — BUPIVACAINE HYDROCHLORIDE 2.5 MG/ML
INJECTION, SOLUTION EPIDURAL; INFILTRATION; INTRACAUDAL AS NEEDED
Status: DISCONTINUED | OUTPATIENT
Start: 2021-04-20 | End: 2021-04-20 | Stop reason: HOSPADM

## 2021-04-20 RX ORDER — SUCCINYLCHOLINE/SOD CL,ISO/PF 100 MG/5ML
SYRINGE (ML) INTRAVENOUS AS NEEDED
Status: DISCONTINUED | OUTPATIENT
Start: 2021-04-20 | End: 2021-04-20

## 2021-04-20 RX ORDER — MIDAZOLAM HYDROCHLORIDE 2 MG/2ML
INJECTION, SOLUTION INTRAMUSCULAR; INTRAVENOUS AS NEEDED
Status: DISCONTINUED | OUTPATIENT
Start: 2021-04-20 | End: 2021-04-20

## 2021-04-20 RX ORDER — SENNA PLUS 8.6 MG/1
1 TABLET ORAL 2 TIMES DAILY
Qty: 60 TABLET | Refills: 2 | Status: SHIPPED | OUTPATIENT
Start: 2021-04-20

## 2021-04-20 RX ORDER — HYDROMORPHONE HCL/PF 1 MG/ML
0.5 SYRINGE (ML) INJECTION
Status: DISCONTINUED | OUTPATIENT
Start: 2021-04-20 | End: 2021-04-20 | Stop reason: HOSPADM

## 2021-04-20 RX ORDER — LIDOCAINE HYDROCHLORIDE AND EPINEPHRINE 10; 10 MG/ML; UG/ML
INJECTION, SOLUTION INFILTRATION; PERINEURAL AS NEEDED
Status: DISCONTINUED | OUTPATIENT
Start: 2021-04-20 | End: 2021-04-20 | Stop reason: HOSPADM

## 2021-04-20 RX ORDER — DEXAMETHASONE SODIUM PHOSPHATE 10 MG/ML
INJECTION, SOLUTION INTRAMUSCULAR; INTRAVENOUS AS NEEDED
Status: DISCONTINUED | OUTPATIENT
Start: 2021-04-20 | End: 2021-04-20

## 2021-04-20 RX ORDER — FENTANYL CITRATE/PF 50 MCG/ML
25 SYRINGE (ML) INJECTION
Status: DISCONTINUED | OUTPATIENT
Start: 2021-04-20 | End: 2021-04-20 | Stop reason: HOSPADM

## 2021-04-20 RX ORDER — SODIUM CHLORIDE, SODIUM LACTATE, POTASSIUM CHLORIDE, CALCIUM CHLORIDE 600; 310; 30; 20 MG/100ML; MG/100ML; MG/100ML; MG/100ML
INJECTION, SOLUTION INTRAVENOUS CONTINUOUS PRN
Status: DISCONTINUED | OUTPATIENT
Start: 2021-04-20 | End: 2021-04-20

## 2021-04-20 RX ORDER — PROPOFOL 10 MG/ML
INJECTION, EMULSION INTRAVENOUS AS NEEDED
Status: DISCONTINUED | OUTPATIENT
Start: 2021-04-20 | End: 2021-04-20

## 2021-04-20 RX ORDER — OXYCODONE AND ACETAMINOPHEN 10; 325 MG/1; MG/1
1 TABLET ORAL EVERY 6 HOURS PRN
Qty: 30 TABLET | Refills: 0 | Status: SHIPPED | OUTPATIENT
Start: 2021-04-20

## 2021-04-20 RX ADMIN — CEFAZOLIN SODIUM 2000 MG: 2 SOLUTION INTRAVENOUS at 07:40

## 2021-04-20 RX ADMIN — ONDANSETRON 4 MG: 2 INJECTION INTRAMUSCULAR; INTRAVENOUS at 08:41

## 2021-04-20 RX ADMIN — FENTANYL CITRATE 25 MCG: 50 INJECTION, SOLUTION INTRAMUSCULAR; INTRAVENOUS at 07:39

## 2021-04-20 RX ADMIN — CHLORHEXIDINE GLUCONATE 0.12% ORAL RINSE 15 ML: 1.2 LIQUID ORAL at 06:51

## 2021-04-20 RX ADMIN — PROPOFOL 100 MG: 10 INJECTION, EMULSION INTRAVENOUS at 07:39

## 2021-04-20 RX ADMIN — ROCURONIUM BROMIDE 10 MG: 10 INJECTION, SOLUTION INTRAVENOUS at 07:39

## 2021-04-20 RX ADMIN — Medication 80 MG: at 07:39

## 2021-04-20 RX ADMIN — MIDAZOLAM 1 MG: 1 INJECTION INTRAMUSCULAR; INTRAVENOUS at 07:37

## 2021-04-20 RX ADMIN — DEXAMETHASONE SODIUM PHOSPHATE 4 MG: 10 INJECTION, SOLUTION INTRAMUSCULAR; INTRAVENOUS at 07:49

## 2021-04-20 RX ADMIN — SODIUM CHLORIDE, SODIUM LACTATE, POTASSIUM CHLORIDE, AND CALCIUM CHLORIDE: .6; .31; .03; .02 INJECTION, SOLUTION INTRAVENOUS at 07:39

## 2021-04-20 RX ADMIN — OXYCODONE HYDROCHLORIDE 5 MG: 5 TABLET ORAL at 10:29

## 2021-04-20 RX ADMIN — PROPOFOL 75 MCG/KG/MIN: 10 INJECTION, EMULSION INTRAVENOUS at 07:47

## 2021-04-20 NOTE — DISCHARGE INSTRUCTIONS
Discharge Instructions  Spinal Cord Stimulator (SCS)  Activity:  1  Do not lift more than 10 pounds for 6 weeks  2  Avoid bending, lifting and twisting for 6 weeks  3  No strenuous activities  No driving for 2 weeks  4  When able to shower, continue to use clean towel and washcloth for 2 weeks post-op  5  Continue to change bed linens and pajamas more frequently  Wear clean clothes daily  Surgical incision care: For Permanent SCS placement:   1  Keep incisions dry for 3 days  2  May shower with mild antimicrobial soap after 3 days  3  After 3 days, incisions may be left open to air, but must remain clean  For Trial SCS placement:   1  Wires and stimulator will be secured with a dressing  Do NOT remove  Keep dressing DRY  2  No showering with Trial in place  Sponge bath only  Do not immerse the incisions in water for 6 weeks  Do not apply any creams or ointments to the incision for 6 weeks, unless otherwise instructed by [facility]  Contact office if increasing redness, drainage, pain or swelling around the incisions  Postoperative medication:  Complete course of antibiotic as directed  1  For permanent spinal cord stimulators: 3 days   2  For spinal cord stimulator trials: Continue for duration of trial and 3 days after leads are pulled unless directed otherwise  [address] will provide pain medication as coordinated with your pain specialist  All prescriptions must come from a single provider  1  Take all medications as prescribed  Call office with any questions/concerns  Please contact office for questions regarding dosage and modifications  No antiplatelet, anticoagulation or Nonsteroidal anti-inflammatory (NSAIDs) medication until cleared by [address], unless otherwise instructed  Do not operate heavy machinery or vehicles while taking sedating medications     Use a bowel regimen while on opioids as they induce constipation  Ie  Senokot-S, Miralax, Colace, etc  Increase fiber and water intake  ***Note that it may take some time for the stimulator to improve chronic pain symptoms  Adjustment of the stimulator program can begin 2 weeks after placement  For TRIALS, there will be ongoing communication with the rep and adjustments as indicated  Please contact the stimulator representative if you have any questions regarding programing  ***

## 2021-04-20 NOTE — OP NOTE
OPERATIVE REPORT  PATIENT NAME: Tono Levi    :  1945  MRN: [id number]  Pt Location: [facility] OR ROOM 03    SURGERY DATE: 2021    Surgeon(s) and Role:     * Genevieve Elaine MD - Primary     * Dawn Campbell MD - Assisting     * Robyn Delong PA-C - Assisting    Preop Diagnosis:  Chronic pain syndrome [G89 4]  Postlaminectomy syndrome [M96 1]  Lumbar radiculopathy [M54 16]    Post-Op Diagnosis Codes:     * Chronic pain syndrome [G89 4]     * Postlaminectomy syndrome [M96 1]     * Lumbar radiculopathy [M54 16]    Procedure(s) (LRB):  INSERTION THORACIC DORSAL COLUMN SPINAL CORD STIMULATOR PERCUTANEOUS W IMPLANTABLE PULSE GENERATOR, LEFT FLANK (Left)    Specimen(s):  * No specimens in log *    Estimated Blood Loss:   Minimal    Drains:  * No LDAs found *    Anesthesia Type:   General    Operative Indications:  Chronic pain syndrome [G89 4]  Postlaminectomy syndrome [M96 1]  Lumbar radiculopathy [M54 16]      Operative Findings:  See dictated note  [employer]  SN: [id number]  x2 [id number]    Complications:   None    Procedure and Technique:  The patient was taken to operative theater induced under general anesthesia and intubated she was positioned prone a Daniel frame  The sweet spot during the trial was planned at T8 and T9  A left flank incision was planned for the generator she was prepped and draped in sterile fashion  We made an incision with a 10 blade and performed electrocautery   We dissected down to fascia  We used an epidmed needle to gain epidural access via loss of resistance technique  We traveled two electrodes through two different epidural access needles up to towards approximately T8 and T9  We confirmed placement of the the lead based on fluoroscoopy  We tested the EMG response by altering the following parameters using a neurostimulation device   We programmed the following:    Frequency: 10-60 hertz   Pulse width of 350 us  Amplitudes: 0-10 mA   Contacts: midline contacts alternating contacts on the 16 contact electrode  Configuration: Bipolar with (+) and (-)  Cycling: None  Waveform: Tonic    We obtained the appropriate EMG response on the right and left EMG response at high amplitude 15 mA  The pocket on the left flank was created with an knife and electrocautery  We tunneled that electrodes connected to the impulse generator and this was connected  We then tested impedances which were normal     The leads been anchored down using anchors under serial of fluoroscopy  After all hardware was in place we irrigated each wound  And then closed in layers using 2 0 Vicryl for the subcutaneous tissues and monocryl for the skin sterile dressing was placed  Patient was repositioned supine the hospital bed extubated to room air  She was taken to the postop recovery area stable condition  All needle and sponge counts were correct at the procedure  All neuromonitoring remained stable during the procedure      I was present for the entire procedure, A qualified resident physician was not available and A physician assistant was required during the procedure for retraction tissue handling,dissection and suturing    Patient Disposition:  PACU     SIGNATURE: Waldemar Gilliland MD  DATE: April 20, 2021  TIME: 9:02 AM

## 2021-04-20 NOTE — ANESTHESIA PREPROCEDURE EVALUATION
Procedure:  INSERTION THORACIC DORSAL COLUMN SPINAL CORD STIMULATOR PERCUTANEOUS W IMPLANTABLE PULSE GENERATOR, LEFT FLANK (Left Spine Thoracic)    Relevant Problems   No relevant active problems        Physical Exam    Airway    Mallampati score: I  TM Distance: >3 FB  Neck ROM: full     Dental   upper dentures and lower dentures,     Cardiovascular  Rhythm: irregular, Rate: normal,     Pulmonary  Breath sounds clear to auscultation,     Other Findings        Anesthesia Plan  ASA Score- 3     Anesthesia Type- general with ASA Monitors  Additional Monitors:   Airway Plan: ETT  Comment: Cardiac note reviewed  PLAN:  She longstanding history of coronary artery disease previous myocardial infarction CABG and stenting also atrial fibrillation being treated with rate control and Coumadin since the death of her grandson she remained depressed and anxious  Recently had infected left big toe amputated with debridement antibiotic and finally hyperbaric oxygen for 14 days did resolve infection  The atrial fibrillation is controlled on beta-blocker and long-term Coumadin   Her cardiac status stable to undergo this procedure  Advised to stop aspirin 7 days before and stay off for 14 days afterward and stop Coumadin 7 days before than 7 days after the procedure  Advised to take metoprolol with a few sips water on the morning of the operation       Patient cleared by cardiologist     Pt followed all directions  Understands risks benefits alternatives          Plan Factors-    Chart reviewed  Patient is not a current smoker  Induction- intravenous  Postoperative Plan- Plan for postoperative opioid use  Informed Consent- Anesthetic plan and risks discussed with patient and daughter  I personally reviewed this patient with the CRNA  Discussed and agreed on the Anesthesia Plan with the CRNA  Gorge Coronado

## 2021-04-20 NOTE — TELEPHONE ENCOUNTER
Received call for [facility] to verify information received from deepak---patient telephoned reporting she had a spinal cord stimulator inserted and does not have pain medication  Last fill 3/24 90 pills for  30 days (tID) dosing)   Per Julio C Lovett she should have medication remaining until 4/23 approximately 9 pills remaining  Informed Julio C Lovett as per their protocol NSX will manage postoperative pain for 5 - 7 days , therefore increasing opiate dose   I will call patient and discuss, Julio C Lovett is in agreement with plan  Finally reached patient she just returned home from surgery  Is status post INSERTION THORACIC DORSAL COLUMN SPINAL CORD STIMULATOR PERCUTANEOUS W IMPLANTABLE PULSE GENERATOR, LEFT FLANK (Left Spine Thoracic)      Informed patient I spoke with Julio C Lovett , she should have 9 remaining pills thru 4/23 , patient replied she was in so much pain  Before surgery ws taking pills every 6 - 7 hours and depleted supply  Explained at her age and criteria will order percocet 10/326 one tablet every 6 hours do not exceed--4 per day  Advised not to take Ambien while taking increased percocet dose at every 6 hours   Cautioned taking  TraZODONE and Paxil concurrently with percocet , space dosing     Postop percocet will order one every 6 hours  For today of 5 days   Although she has a script at the pharmacy from [facility] for  on 4/23 pharmacy maria[facility] not fill secondary to my script will last about 7 days  Narcan triggered and ordered     Advised on bowel hygiene protocol, has docusate will start today 50 mg po BID   Ordered senna one bid   Will drink required fluids and eat dietary fiber,    She verbalized understanding and agreement with plan     Explained a nurse will call tomorrow to check status

## 2021-04-20 NOTE — ANESTHESIA POSTPROCEDURE EVALUATION
Post-Op Assessment Note    CV Status:  Stable  Pain Score: 0    Pain management: adequate     Mental Status:  Alert and awake   Hydration Status:  Euvolemic   PONV Controlled:  None   Airway Patency:  Patent      Post Op Vitals Reviewed: Yes      Staff: CRNA         No complications documented      BP      Temp      Pulse     Resp      SpO2

## 2021-04-20 NOTE — INTERVAL H&P NOTE
H&P reviewed  After examining the patient I find no changes in the patients condition since the H&P had been written      Vitals:    04/20/21 0643   BP: 103/74   Pulse: 67   Resp: 18   Temp: 98 °F (36 7 °C)   SpO2: 97%

## 2021-04-21 ENCOUNTER — TELEPHONE (OUTPATIENT)
Dept: NEUROSURGERY | Facility: CLINIC | Age: 76
End: 2021-04-21

## 2021-04-22 ENCOUNTER — TELEMEDICINE (OUTPATIENT)
Dept: NEUROSURGERY | Facility: CLINIC | Age: 76
End: 2021-04-22

## 2021-04-22 DIAGNOSIS — Z98.890 POST-OPERATIVE STATE: Primary | ICD-10-CM

## 2021-04-22 PROCEDURE — 1124F ACP DISCUSS-NO DSCNMKR DOCD: CPT

## 2021-04-22 PROCEDURE — 99024 POSTOP FOLLOW-UP VISIT: CPT

## 2021-04-22 NOTE — PROGRESS NOTES
Virtual Brief Visit    Assessment/Plan:    Problem List Items Addressed This Visit     None      Visit Diagnoses     Post-operative state    -  Primary                Reason for visit is   Chief Complaint   Patient presents with    Post-op    Virtual Brief Visit        Encounter provider Neurosurgery Nurse [facility]    Provider located at [address] [place]  [address] [facility] [id number] [phone]    Recent Visits  Date Type Provider Dept   04/20/21 Telephone EMIGDIO [facility]   04/19/21 Telephone Alex Conner recent visits within past 7 days and meeting all other requirements     Today's Visits  Date Type Provider Dept   04/22/21 Telemedicine NEUROSURGERY NURSE [address] today's visits and meeting all other requirements     Future Appointments  No visits were found meeting these conditions  Showing future appointments within next 150 days and meeting all other requirements        After connecting through telephone, the patient was identified by name and date of birth  Norberto Jose was informed that this is a telemedicine visit and that the visit is being conducted through telephone  My office door was closed  No one else was in the room  She acknowledged consent and understanding of privacy and security of the platform  The patient has agreed to participate and understands she can discontinue the visit at any time  Subjective    Norberto Jose is a 70 y o  female s/p INSERTION THORACIC DORSAL COLUMN SPINAL CORD STIMULATOR PERCUTANEOUS W IMPLANTABLE PULSE GENERATOR, LEFT FLANK (Left Spine Thoracic)    HPI     Past Medical History:   Diagnosis Date    Chronic atrial fibrillation ([address] )     Myocardial infarction [facility])        Past Surgical History:   Procedure Laterality Date    BACK SURGERY      with hardware    CORONARY ARTERY BYPASS GRAFT      stents x 2 post CABG    GASTRIC BYPASS      HYSTERECTOMY      IA PERCUT IMPLNT NEUROELECT,EPIDURAL Left 4/20/2021    Procedure: INSERTION THORACIC DORSAL COLUMN SPINAL CORD STIMULATOR PERCUTANEOUS W IMPLANTABLE PULSE GENERATOR, LEFT FLANK;  Surgeon: Symone Craig MD;  Location:  MAIN OR;  Service: Neurosurgery       Current Outpatient Medications   Medication Sig Dispense Refill    acetaminophen (TYLENOL) 500 mg tablet Take 500 mg by mouth every 4 (four) hours as needed      ascorbic acid (VITAMIN C) 500 MG tablet Take 500 mg by mouth daily      atorvastatin (LIPITOR) 80 mg tablet TAKE 1 TABLET NIGHTLY      calcium citrate-vitamin D (CITRACAL+D) 315-200 MG-UNIT per tablet 1 tablet 2x daily      co-enzyme Q-10 30 MG capsule Take 100 mg by mouth daily      docusate sodium (COLACE) 100 mg capsule Take 100 mg by mouth 2 (two) times a day      doxycycline hyclate (VIBRAMYCIN) 100 mg capsule Take 1 capsule (100 mg total) by mouth every 12 (twelve) hours for 3 days Start 4/20/2021 after surgery 6 capsule 0    ezetimibe (ZETIA) 10 mg tablet TAKE 1 TABLET DAILY      gabapentin (NEURONTIN) 800 mg tablet Take 800 mg by mouth 3 (three) times a day      metoprolol tartrate (LOPRESSOR) 25 mg tablet Take 12 5 mg by mouth every 12 (twelve) hours       Multiple Vitamins-Iron (Multi-Vitamin/Iron) TABS Take by mouth      naloxone (NARCAN) 4 mg/0 1 mL nasal spray Administer 1 spray into a nostril  If no response after 2-3 minutes, give another dose in the other nostril using a new spray   1 each 1    nitroglycerin (Nitrostat) 0 4 mg SL tablet       oxyCODONE-acetaminophen (PERCOCET)  mg per tablet Take 1 tablet by mouth every 6 (six) hours as needed for moderate painMax Daily Amount: 4 tablets 30 tablet 0    PARoxetine (PAXIL) 30 mg tablet Take 30 mg by mouth daily      senna (SENOKOT) 8 6 MG tablet Take 1 tablet (8 6 mg total) by mouth 2 (two) times a day 60 tablet 2    traZODone (DESYREL) 50 mg tablet Take 50 mg by mouth daily at bedtime       zolpidem (Ambien) 10 mg tablet Take 10 mg by mouth daily at bedtime as needed        No current facility-administered medications for this visit  Called patient to see how she is doing after surgery  Patient reports she is doing well overall and denies any incisional issues or fevers  Patient is able to ambulate around the house and complete ADLs  Educated the patient about the importance of preventing blood clots and provided measures how to prevent them  Patient has not moved her bowels since the surgery  Encouraged patient to take an over the counter stool softener, if she is taking narcotic pain medication  Noted conversation with EMIGDIO GIBSON 4/20 during which the patient was advise of the bowel protocol  Instructed her to follow the instructions as layed out by EMIGDIO GIBSON and call the office if she still has not moved her bowels in the next 1-2 days  Encouraged fiber intake and fluids  Reviewed incision care with the patient  Advised that after three days she may take a shower and gently wash the surgical site with soap and water  Use clean wash cloth, towels, and clothing  Do not submerge in water until cleared by the surgeon  Do not apply any creams, ointments, or lotions to the site  Patient is aware to call the office if any redness, swelling, drainage, dehiscence of incision, or fever >100 F occurs  Patient is aware to call the office if any concerns or questions may arise  Reminded patient of her upcoming appointments with the date/time/location - confirmed aware in [place]  Patient was appreciative for the call  VIRTUAL VISIT [address] [place] [address] Drive acknowledges that she has consented to an online visit or consultation   She understands that the online visit is based solely on information provided by her, and that, in the absence of a face-to-face physical evaluation by the physician, the diagnosis she receives is both limited and provisional in terms of accuracy and completeness  This is not intended to replace a full medical face-to-face evaluation by the physician  Shelly Lawson understands and accepts these terms

## 2021-05-04 ENCOUNTER — OFFICE VISIT (OUTPATIENT)
Dept: NEUROSURGERY | Facility: CLINIC | Age: 76
End: 2021-05-04

## 2021-05-04 VITALS
SYSTOLIC BLOOD PRESSURE: 130 MMHG | WEIGHT: 143 LBS | BODY MASS INDEX: 26.31 KG/M2 | HEIGHT: 62 IN | DIASTOLIC BLOOD PRESSURE: 71 MMHG | TEMPERATURE: 98.5 F | HEART RATE: 63 BPM | RESPIRATION RATE: 16 BRPM

## 2021-05-04 DIAGNOSIS — G89.18 POSTOPERATIVE PAIN AFTER SPINAL SURGERY: ICD-10-CM

## 2021-05-04 DIAGNOSIS — Z96.89 STATUS POST INSERTION OF SPINAL CORD STIMULATOR: ICD-10-CM

## 2021-05-04 DIAGNOSIS — Z98.890 POSTOPERATIVE STATE: ICD-10-CM

## 2021-05-04 DIAGNOSIS — G89.4 CHRONIC PAIN SYNDROME: Primary | ICD-10-CM

## 2021-05-04 PROBLEM — M96.1 POSTLAMINECTOMY SYNDROME: Status: ACTIVE | Noted: 2021-05-04

## 2021-05-04 PROBLEM — M54.16 LUMBAR RADICULOPATHY: Status: ACTIVE | Noted: 2021-05-04

## 2021-05-04 PROCEDURE — 99024 POSTOP FOLLOW-UP VISIT: CPT | Performed by: NURSE PRACTITIONER

## 2021-05-04 RX ORDER — LANOLIN ALCOHOL/MO/W.PET/CERES
1000 CREAM (GRAM) TOPICAL DAILY
COMMUNITY

## 2021-05-04 RX ORDER — ASPIRIN 81 MG/1
81 TABLET ORAL DAILY
COMMUNITY

## 2021-05-04 RX ORDER — WARFARIN SODIUM 5 MG/1
5 TABLET ORAL
COMMUNITY

## 2021-05-04 NOTE — ASSESSMENT & PLAN NOTE
· As addressed in HPI  · As addressed in status post insertion spinal cord stimulator       PLAN  · As addressed in status post insertion spinal cord stimulator

## 2021-05-04 NOTE — PATIENT INSTRUCTIONS
Instructions for continued care at 2 weeks postop thru 6 weeks postop  · At 2 weeks postop can resume restricted medications such as ASA, products containing ASA, NSAID, fish oils, and OTC products or as previously directed  MUST RESTART COUMADIN TODAY , CALL PRESCRIBING DR TODAY  FOR DIRECTION ON DOSING AND WHEN NEXT BLOOD TEST DUE   DID NOT RESTART ON 4/28 AS DIRECTED PREOPERATIVELY   As per our call to PCP office nurse Jaimee  will contact Dr determine coumadin dose to start today and when lab/blood work will be obtained  · Resume driving 2 week postoperatively  · Continue to observe incisions for redness, drainage, swelling, dehiscence, increased pain, fever >/= 101, warmth to touch incision or skin surrounding, if occur call or report to office immediately for reassessment  · Explained monocryl reasonable sutures can take up to 90 days to reabsorb  · Do not remove glue adherent to incision lines covering scabs will eventually disappear  --NO EVIDENCE OF RESIDUAL GLUE TODAY    · Continue showers using clean towel and wash cloth with OTC antibacterial body wash, pat dry after showering continue protocol for an additional 2 weeks  · Do not apply lotions, creams, powder, or ointments to surgical incisions  · Activity as tolerated, no bending, lifting  greater than 10 lbs, turning, stretching, no pulling, pushing  ambulation as tolerated  · Refrain from strenuous activity, bending, and  twisting back  · No Immersion in water such as swimming, hot tub, or tub bath  · If  there is any significant change condition  call and/or return to the office immediately for reassessment  · Explained in detail barrier protection when charging generator, never place charging device or shay directly on incision, provide barrier such as an item of thin clothing  Always provide clean storage for generator and shay  · Directed to discuss with Rep protocol for cleaning generator shay    · Met with product rep for device programing and teaching, refer to attached session report  · Explained chronic pain relief may not be immediate after reprogramming can take  Up to 72 hours to feel effect   · Contact Rep immediately if there is any change in efficacy or concern over SCS system     · Contact office if unable to reach Rep or if the reps   intervention does  not resolve issue

## 2021-05-04 NOTE — PROGRESS NOTES
Assessment/Plan:    Chronic pain syndrome  · As addressed in HPI  · As addressed in status post insertion spinal cord stimulator       PLAN  · As addressed in status post insertion spinal cord stimulator  Status post insertion of spinal cord stimulator  · As addressed in hPI  · 2 week PO presents for after care , surgical incisions lumbar and left upper buttock healed  · No residual glue or Monocryl suture on either incision   · Both incisions are c/d/i , no dehiscence, erythema,  well healed  · OK for spinal cord stimulator activation today  · Continues with postop surgical pain will treat with surrent med regimen Dr Irvin Carcamo office prescribes scheduled Norco TID prn   · She met with [employer] Rep for SCS activation and teaching   · [employer] Session report date pending      LD Group A     Frequency:  500 hz  Pulse width of  500 uS  Amplitudes:   2 5mA  Contacts:  Midline alternating 16 contact electrode  Configuration:  Bipolar with +/-  Cycling:  none  Waveform:  No cycling    Group B  Frequency: 500 hz  Pulse width of  500 uS  Amplitudes:   2 2mA  Contacts:Midline alternating 16 contact electrode  Configuration: Bipolar with +/-  Cycling:  none  Waveform: ?     Group C  Frequency:500 hz    Pulse width of  500 uS  Amplitudes:3 5 mA     Contacts: Bipolar with  +/-  Configuration:  Midline alternating 16 contact electrode  Cycling: none   Waveform:      PLAN  · Instructions for continued care documented in AVS, reviewed in detail, and copy provided         Subjective: 2 week postoperative visit     Patient ID: Cecy Johnson is a 70 y o  female     HPI   She has a longstanding history of chronic pain affecting her bilateral low back and bilateral buttocks, left greater than right sided pain  Has neuropathy in bilateral feet and toes left greater than right  She failed conservative treatment with physical  therapy and injections   She is status post lumbar surgery     She underwent a trial spinal cord stimulator w/ Dr Durant Husbands , achieved greater than 50 % relief of chronic pain symptoms  She consulted with Dr Alma Ross 3/39/2021 after assessment and review, she is determined to be and appropriate candidate to proceed with permanent spinal cord stimulator implant  Surgery was scheduled for 4/20/21 w/ Dr Yemi Dominguez, LEFT FLANK (Left Spine Thoracic)  Impressions and treatment recommendations were discussed in detail with the patient who verbalized understanding, all questions were answered and contact information was given in the event future questions arise  REVIEW OF SYSTEMS  Review of Systems   Constitutional: Negative  HENT: Negative  Eyes: Negative  Respiratory: Negative  Cardiovascular:        Hx of cardiac stents, 2  Open heart surgery   Gastrointestinal: Negative  Endocrine: Negative  Genitourinary: Negative  Musculoskeletal: Positive for back pain (LBP across the waist, left foot gret toe) and gait problem (walker)  Hx of lumbar fusion   Skin: Positive for wound (surgical incisions)  Allergic/Immunologic: Negative  Neurological: Positive for numbness (left leg)  Negative for weakness and headaches  Neuropathy   Hematological: Bruises/bleeds easily (warfarin, asa, fish oil)  Psychiatric/Behavioral: Positive for sleep disturbance           Meds/Allergies     Current Outpatient Medications   Medication Sig Dispense Refill    acetaminophen (TYLENOL) 500 mg tablet Take 500 mg by mouth every 4 (four) hours as needed      atorvastatin (LIPITOR) 80 mg tablet TAKE 1 TABLET NIGHTLY      docusate sodium (COLACE) 100 mg capsule Take 100 mg by mouth 2 (two) times a day      ezetimibe (ZETIA) 10 mg tablet TAKE 1 TABLET DAILY      gabapentin (NEURONTIN) 800 mg tablet Take 800 mg by mouth 3 (three) times a day      metoprolol tartrate (LOPRESSOR) 25 mg tablet Take 12 5 mg by mouth every 12 (twelve) hours       oxyCODONE-acetaminophen (PERCOCET)  mg per tablet Take 1 tablet by mouth every 6 (six) hours as needed for moderate painMax Daily Amount: 4 tablets 30 tablet 0    PARoxetine (PAXIL) 30 mg tablet Take 30 mg by mouth daily      traZODone (DESYREL) 50 mg tablet Take 50 mg by mouth daily at bedtime       zolpidem (Ambien) 10 mg tablet Take 10 mg by mouth daily at bedtime as needed       ascorbic acid (VITAMIN C) 500 MG tablet Take 500 mg by mouth daily      aspirin (ECOTRIN LOW STRENGTH) 81 mg EC tablet Take 81 mg by mouth daily      BETA CAROTENE PO Take 7,500 mcg by mouth daily      calcium citrate-vitamin D (CITRACAL+D) 315-200 MG-UNIT per tablet 1 tablet 2x daily      co-enzyme Q-10 30 MG capsule Take 100 mg by mouth daily      IRON, FERROUS SULFATE, PO Take 65 mg by mouth daily      Multiple Vitamins-Iron (Multi-Vitamin/Iron) TABS Take by mouth      naloxone (NARCAN) 4 mg/0 1 mL nasal spray Administer 1 spray into a nostril  If no response after 2-3 minutes, give another dose in the other nostril using a new spray  (Patient not taking: Reported on 5/4/2021) 1 each 1    nitroglycerin (Nitrostat) 0 4 mg SL tablet       Omega-3 Fatty Acids (FISH OIL PO) Take by mouth daily      senna (SENOKOT) 8 6 MG tablet Take 1 tablet (8 6 mg total) by mouth 2 (two) times a day (Patient not taking: Reported on 5/4/2021) 60 tablet 2    thiamine 250 MG tablet Take 250 mg by mouth daily      vitamin B-12 (VITAMIN B-12) 1,000 mcg tablet Take 1,000 mcg by mouth daily      warfarin (COUMADIN) 5 mg tablet Take 5 mg by mouth daily       No current facility-administered medications for this visit          No Known Allergies    PAST HISTORY    Past Medical History:   Diagnosis Date    Chronic atrial fibrillation (HCC)     Myocardial infarction [facility])        Past Surgical History:   Procedure Laterality Date    BACK SURGERY      with hardware    CORONARY ARTERY BYPASS GRAFT stents x 2 post CABG    GASTRIC BYPASS      HYSTERECTOMY      [place] PERCUT IMPLNT NEUROELECT,EPIDURAL Left 4/20/2021    Procedure: INSERTION THORACIC DORSAL COLUMN SPINAL CORD STIMULATOR PERCUTANEOUS W IMPLANTABLE PULSE GENERATOR, LEFT FLANK;  Surgeon: Mary Anne Nguyen MD;  Location:  MAIN OR;  Service: Neurosurgery       Social History     Tobacco Use    Smoking status: Never Smoker    Smokeless tobacco: Never Used   Substance Use Topics    Alcohol use: Not Currently     Frequency: Never    Drug use: Never       No family history on file  The following portions of the patient's history were reviewed and updated as appropriate: allergies, current medications, past family history, past medical history, past social history, past surgical history and problem list       EXAM    Vitals:Blood pressure 130/71, pulse 63, temperature 98 5 °F (36 9 °C), temperature source Tympanic, resp  rate 16, height 5' 2" (1 575 m), weight 64 9 kg (143 lb)  ,Body mass index is 26 16 kg/m²  Physical Exam  Vitals signs and nursing note reviewed  Exam conducted with a chaperone present (DAUGHTER)  Constitutional:       General: She is not in acute distress  Appearance: Normal appearance  She is normal weight  She is not ill-appearing, toxic-appearing or diaphoretic  Eyes:      General: No scleral icterus  Right eye: No discharge  Left eye: No discharge  Extraocular Movements: Extraocular movements intact  Pulmonary:      Effort: Pulmonary effort is normal  No respiratory distress  Musculoskeletal:         General: No swelling  Right lower leg: No edema  Left lower leg: No edema  Skin:     General: Skin is warm and dry  Neurological:      General: No focal deficit present  Mental Status: She is alert and oriented to person, place, and time  Gait: Gait is intact     Psychiatric:         Mood and Affect: Mood normal          Behavior: Behavior normal          Neurologic Exam Mental Status   Oriented to person, place, and time  Level of consciousness: alert    Gait, Coordination, and Reflexes     Gait  Gait: normal      Imaging Studies  Xr Spine Thoracic 2 Vw    Result Date: 4/25/2021  Narrative: C-ARM - thoracic spine INDICATION: insertion thoracic spinal cord stimulator   Procedure guidance  COMPARISON:  None TECHNIQUE: FLUOROSCOPY TIME:   129 8 seconds 3 FLUOROSCOPIC IMAGES FINDINGS: Fluoroscopic guidance provided for procedure guidance  Osseous and soft tissue detail limited by technique  Impression: Fluoroscopic guidance provided for procedure guidance  Please refer to the separate procedure notes for additional details  Workstation performed: [id number]       I have personally reviewed pertinent reports     and I have personally reviewed pertinent films in PACS

## 2021-05-04 NOTE — ASSESSMENT & PLAN NOTE
· As addressed in hPI  · 2 week PO presents for after care , surgical incisions lumbar and left upper buttock healed  · No residual glue or Monocryl suture on either incision   · Both incisions are c/d/i , no dehiscence, erythema,  well healed  · OK for spinal cord stimulator activation today  · Continues with postop surgical pain will treat with surrent med regimen Dr Alicia Petit office prescribes scheduled Norco TID prn   · She met with [employer] Rep for SCS activation and teaching     · [employer] Session report date pending      LD Group A     Frequency:  500 hz  Pulse width of  500 uS  Amplitudes:   2 5mA  Contacts:  Midline alternating 16 contact electrode  Configuration:  Bipolar with +/-  Cycling:  none  Waveform:  No cycling    Group B  Frequency: 500 hz  Pulse width of  500 uS  Amplitudes:   2 2mA  Contacts:Midline alternating 16 contact electrode  Configuration: Bipolar with +/-  Cycling:  none  Waveform: ?     Group C  Frequency:500 hz    Pulse width of  500 uS  Amplitudes:3 5 mA     Contacts: Bipolar with  +/-  Configuration:  Midline alternating 16 contact electrode  Cycling: none   Waveform:      PLAN  · Instructions for continued care documented in AVS, reviewed in detail, and copy provided

## 2021-06-03 ENCOUNTER — OFFICE VISIT (OUTPATIENT)
Dept: NEUROSURGERY | Facility: CLINIC | Age: 76
End: 2021-06-03

## 2021-06-03 VITALS
HEIGHT: 62 IN | WEIGHT: 143 LBS | RESPIRATION RATE: 16 BRPM | DIASTOLIC BLOOD PRESSURE: 58 MMHG | SYSTOLIC BLOOD PRESSURE: 118 MMHG | HEART RATE: 82 BPM | BODY MASS INDEX: 26.31 KG/M2 | TEMPERATURE: 98.5 F

## 2021-06-03 DIAGNOSIS — G89.4 CHRONIC PAIN SYNDROME: Primary | ICD-10-CM

## 2021-06-03 PROCEDURE — 99024 POSTOP FOLLOW-UP VISIT: CPT | Performed by: NEUROLOGICAL SURGERY

## 2021-06-03 RX ORDER — METOPROLOL SUCCINATE 25 MG/1
12.5 TABLET, EXTENDED RELEASE ORAL DAILY
COMMUNITY
Start: 2021-05-26 | End: 2022-05-26

## 2021-06-03 RX ORDER — LISINOPRIL 2.5 MG/1
2.5 TABLET ORAL DAILY
COMMUNITY
Start: 2021-05-26 | End: 2022-05-26

## 2021-06-03 NOTE — PROGRESS NOTES
Assessment/Plan:    No problem-specific Assessment & Plan notes found for this encounter  Patient 6 weeks postop scs  Doing quite well no complaints  F/u PRN no restrictions     Diagnoses and all orders for this visit:    Chronic pain syndrome    Other orders  -     apixaban (ELIQUIS) 5 mg; Take 5 mg by mouth 2 (two) times a day  -     lisinopril (ZESTRIL) 2 5 mg tablet; Take 2 5 mg by mouth daily  -     metoprolol succinate (TOPROL-XL) 25 mg 24 hr tablet; Take 12 5 mg by mouth daily          Subjective:      Patient ID: Aníbal Muhammad is a 70 y o  female  HPI    6 weeks postop no issues     The following portions of the patient's history were reviewed and updated as appropriate: allergies, current medications, past family history, past medical history, past social history, past surgical history and problem list     Review of Systems   Constitutional: Negative  HENT: Negative  Eyes: Negative  Respiratory: Negative  Cardiovascular:        Hx of cardiac stents, 2  Open heart surgery   Gastrointestinal: Negative  Endocrine: Negative  Genitourinary: Negative  Musculoskeletal: Positive for back pain (LBP across the waist, left foot gret toe) and gait problem (walker)  Hx of lumbar fusion  SCS 95% improved   Skin: Positive for wound (surgical incisions)  Allergic/Immunologic: Negative  Neurological: Positive for numbness (left leg)  Negative for weakness and headaches  Neuropathy   Hematological: Bruises/bleeds easily (warfarin, asa, fish oil)  Psychiatric/Behavioral: Positive for sleep disturbance  I have personally reviewed all aspects of the review of systems as documented    Objective:      /58 (BP Location: Right arm)   Pulse 82   Temp 98 5 °F (36 9 °C) (Tympanic)   Resp 16   Ht 5' 2" (1 575 m)   Wt 64 9 kg (143 lb)   BMI 26 16 kg/m²          Physical Exam  Constitutional:       Appearance: Normal appearance  She is normal weight  Cardiovascular:      Rate and Rhythm: Normal rate  Pulmonary:      Effort: Pulmonary effort is normal    Neurological:      General: No focal deficit present  Mental Status: She is alert and oriented to person, place, and time  Mental status is at baseline         incisions well healed

## 2022-10-20 ENCOUNTER — TELEPHONE (OUTPATIENT)
Dept: OTHER | Facility: OTHER | Age: 77
End: 2022-10-20

## 2022-10-20 NOTE — TELEPHONE ENCOUNTER
Pt looking to make an appointment  Please call back to schedule   Stated having issues with spinal stimulator and want to come to office

## 2022-10-21 NOTE — TELEPHONE ENCOUNTER
10/21/22 CALLED AND SPOKE TO PT, SHE STATES SHE HAS NOT MET WITH OR SPOKE TO [employer] REP ABOUT ISSUES WITH STIM  PROVIDED PT NUMBER FOR ALISE FERRIS TO CALL AND DISCUSS  ADVISED PT TO CALL BACK IF [facility] APPT STILL NEEDED AND WE WILL SCHEDULE HER  PT LAST SEEN BY DR Rebollar Hair 6/3/21

## 2022-11-15 ENCOUNTER — OFFICE VISIT (OUTPATIENT)
Dept: NEUROSURGERY | Facility: CLINIC | Age: 77
End: 2022-11-15

## 2022-11-15 VITALS
SYSTOLIC BLOOD PRESSURE: 112 MMHG | BODY MASS INDEX: 26.43 KG/M2 | WEIGHT: 140 LBS | HEIGHT: 61 IN | DIASTOLIC BLOOD PRESSURE: 74 MMHG | TEMPERATURE: 97.9 F | RESPIRATION RATE: 18 BRPM | HEART RATE: 68 BPM

## 2022-11-15 DIAGNOSIS — Z96.89 STATUS POST INSERTION OF SPINAL CORD STIMULATOR: Primary | ICD-10-CM

## 2022-11-17 ENCOUNTER — TELEPHONE (OUTPATIENT)
Dept: NEUROSURGERY | Facility: CLINIC | Age: 77
End: 2022-11-17

## 2022-11-17 NOTE — TELEPHONE ENCOUNTER
Patient was seen on 11/15/22 w/ Dr Addison Thompson  Per my interaction with the patient she stated that the battery was not charging and it was difficult for her to put it on  I told her I would reach out to the [employer] Rep to get in contact with her regarding this issue  Per Caffie Leaks( [employer] rep) he spoke with Sarah Osullivan and he requested a consult with a physician  for a battery change, Patient was scheduled for a consult on a SCS battery change  I scheduled patient on 11/29/22  I spoke with Dr Addison Thompson on 11/17/22 regarding this appointment  and he stated that her battery has not reached end of life and would not be able to change her battery  I spoke with Galindo Miner about this and she also stated that the insurance company would not pay for a battery change when the current battery is still functioning  I called patient and informed her that Dr Zachary Mcnally would not be able to do the battery change because it was not reach end of life  SCS was placed on 4/2021 and has a battery life of about 3-5 years  Patient verbalized understanding  I will be reaching out to Eduard Mckenna ( [employer] rep) to notify him of this

## 2022-12-13 ENCOUNTER — TELEPHONE (OUTPATIENT)
Dept: OTHER | Facility: OTHER | Age: 77
End: 2022-12-13

## 2022-12-14 NOTE — TELEPHONE ENCOUNTER
12/14/22 CALLED AND SPOKE TO PT CHAIM MARIEE, ADVISED PREVIOUS NOTES FROM 11/15/22 DISCUSSED WITH NB  ADVISED INS WILL NOT COVER SCS BATTERY REPLACEMENT IF NOT AS EOS  ADVISED TO CALL AND SPEAK TO MICHAEL FERRIS  ALSO ADVISED PT COULD PAY OUT OF POCKET FOR PROCEDURE TO SWAP BATTERY  SHE WAS APPRECIATIVE FOR CALL BACK

## 2022-12-14 NOTE — TELEPHONE ENCOUNTER
María Lewis called wanting to make an appointment for patient to discuss with Dr Hopkins Share a new device in her back that is easier for her to use  Please call María Lewis to schedule

## 2023-01-08 ENCOUNTER — TELEPHONE (OUTPATIENT)
Dept: OTHER | Facility: OTHER | Age: 78
End: 2023-01-08

## 2023-01-09 NOTE — TELEPHONE ENCOUNTER
Patient called in stating she is supposed to have an appointment next week for her spinal cord stimulator  I do not show anything in the appt desk  Please call the patient to advise if she should have an appointment or not

## 2023-01-09 NOTE — TELEPHONE ENCOUNTER
Reached out to patient to clarify whether or not she needs an appt  Patient stated she is scheduled with Dr Adriel Vallejo with [facility] and is looking for the phone number and address  Provided her with both  She was appreciative of the call back

## 2023-05-01 ENCOUNTER — HOSPITAL ENCOUNTER (INPATIENT)
Facility: HOSPITAL | Age: 78
LOS: 2 days | Discharge: NURSING, REHAB OR LONG-TERM CARE | End: 2023-05-04
Attending: EMERGENCY MEDICINE | Admitting: FAMILY MEDICINE

## 2023-05-01 ENCOUNTER — APPOINTMENT (EMERGENCY)
Dept: CT IMAGING | Facility: HOSPITAL | Age: 78
End: 2023-05-01

## 2023-05-01 DIAGNOSIS — N39.0 UTI (URINARY TRACT INFECTION): Primary | ICD-10-CM

## 2023-05-01 DIAGNOSIS — I48.20 CHRONIC ATRIAL FIBRILLATION (HCC): ICD-10-CM

## 2023-05-01 DIAGNOSIS — G89.4 CHRONIC PAIN SYNDROME: ICD-10-CM

## 2023-05-01 DIAGNOSIS — R26.81 GAIT INSTABILITY: ICD-10-CM

## 2023-05-01 DIAGNOSIS — G25.0 TREMOR, ESSENTIAL: ICD-10-CM

## 2023-05-01 LAB
ALBUMIN SERPL BCP-MCNC: 4.2 G/DL (ref 3.5–5)
ALP SERPL-CCNC: 133 U/L (ref 34–104)
ALT SERPL W P-5'-P-CCNC: 89 U/L (ref 7–52)
ANION GAP SERPL CALCULATED.3IONS-SCNC: 5 MMOL/L (ref 4–13)
AST SERPL W P-5'-P-CCNC: 55 U/L (ref 13–39)
BACTERIA UR QL AUTO: ABNORMAL /HPF
BASOPHILS # BLD AUTO: 0.07 THOUSANDS/ÂΜL (ref 0–0.1)
BASOPHILS NFR BLD AUTO: 1 % (ref 0–1)
BILIRUB SERPL-MCNC: 0.56 MG/DL (ref 0.2–1)
BILIRUB UR QL STRIP: NEGATIVE
BUN SERPL-MCNC: 12 MG/DL (ref 5–25)
CALCIUM SERPL-MCNC: 9.9 MG/DL (ref 8.4–10.2)
CARDIAC TROPONIN I PNL SERPL HS: 10 NG/L
CHLORIDE SERPL-SCNC: 99 MMOL/L (ref 96–108)
CLARITY UR: CLEAR
CO2 SERPL-SCNC: 31 MMOL/L (ref 21–32)
COLOR UR: YELLOW
CREAT SERPL-MCNC: 0.62 MG/DL (ref 0.6–1.3)
EOSINOPHIL # BLD AUTO: 0.27 THOUSAND/ÂΜL (ref 0–0.61)
EOSINOPHIL NFR BLD AUTO: 3 % (ref 0–6)
ERYTHROCYTE [DISTWIDTH] IN BLOOD BY AUTOMATED COUNT: 13 % (ref 11.6–15.1)
GFR SERPL CREATININE-BSD FRML MDRD: 88 ML/MIN/1.73SQ M
GLUCOSE SERPL-MCNC: 102 MG/DL (ref 65–140)
GLUCOSE SERPL-MCNC: 95 MG/DL (ref 65–140)
GLUCOSE UR STRIP-MCNC: NEGATIVE MG/DL
HCT VFR BLD AUTO: 43 % (ref 34.8–46.1)
HGB BLD-MCNC: 13.9 G/DL (ref 11.5–15.4)
HGB UR QL STRIP.AUTO: ABNORMAL
IMM GRANULOCYTES # BLD AUTO: 0.02 THOUSAND/UL (ref 0–0.2)
IMM GRANULOCYTES NFR BLD AUTO: 0 % (ref 0–2)
KETONES UR STRIP-MCNC: NEGATIVE MG/DL
LEUKOCYTE ESTERASE UR QL STRIP: ABNORMAL
LIPASE SERPL-CCNC: 27 U/L (ref 11–82)
LYMPHOCYTES # BLD AUTO: 1.26 THOUSANDS/ÂΜL (ref 0.6–4.47)
LYMPHOCYTES NFR BLD AUTO: 15 % (ref 14–44)
MAGNESIUM SERPL-MCNC: 2.1 MG/DL (ref 1.9–2.7)
MCH RBC QN AUTO: 31 PG (ref 26.8–34.3)
MCHC RBC AUTO-ENTMCNC: 32.3 G/DL (ref 31.4–37.4)
MCV RBC AUTO: 96 FL (ref 82–98)
MONOCYTES # BLD AUTO: 1.07 THOUSAND/ÂΜL (ref 0.17–1.22)
MONOCYTES NFR BLD AUTO: 12 % (ref 4–12)
NEUTROPHILS # BLD AUTO: 6.01 THOUSANDS/ÂΜL (ref 1.85–7.62)
NEUTS SEG NFR BLD AUTO: 69 % (ref 43–75)
NITRITE UR QL STRIP: POSITIVE
NON-SQ EPI CELLS URNS QL MICRO: ABNORMAL /HPF
NRBC BLD AUTO-RTO: 0 /100 WBCS
PH UR STRIP.AUTO: 7 [PH]
PHOSPHATE SERPL-MCNC: 3.9 MG/DL (ref 2.3–4.1)
PLATELET # BLD AUTO: 213 THOUSANDS/UL (ref 149–390)
PMV BLD AUTO: 10 FL (ref 8.9–12.7)
POTASSIUM SERPL-SCNC: 5.1 MMOL/L (ref 3.5–5.3)
PROT SERPL-MCNC: 7.2 G/DL (ref 6.4–8.4)
PROT UR STRIP-MCNC: NEGATIVE MG/DL
RBC # BLD AUTO: 4.48 MILLION/UL (ref 3.81–5.12)
RBC #/AREA URNS AUTO: ABNORMAL /HPF
SODIUM SERPL-SCNC: 135 MMOL/L (ref 135–147)
SP GR UR STRIP.AUTO: 1.01 (ref 1–1.03)
UROBILINOGEN UR QL STRIP.AUTO: 0.2 E.U./DL
WBC # BLD AUTO: 8.7 THOUSAND/UL (ref 4.31–10.16)
WBC #/AREA URNS AUTO: ABNORMAL /HPF
WBC CLUMPS # UR AUTO: PRESENT /UL

## 2023-05-02 PROBLEM — N30.00 ACUTE CYSTITIS WITHOUT HEMATURIA: Status: ACTIVE | Noted: 2023-05-02

## 2023-05-02 PROBLEM — R26.2 AMBULATORY DYSFUNCTION: Status: ACTIVE | Noted: 2023-05-02

## 2023-05-02 PROBLEM — G25.0 TREMOR, ESSENTIAL: Status: ACTIVE | Noted: 2023-05-02

## 2023-05-02 LAB
ALBUMIN SERPL BCP-MCNC: 3.8 G/DL (ref 3.5–5)
ALP SERPL-CCNC: 112 U/L (ref 34–104)
ALT SERPL W P-5'-P-CCNC: 79 U/L (ref 7–52)
ANION GAP SERPL CALCULATED.3IONS-SCNC: 5 MMOL/L (ref 4–13)
APAP SERPL-MCNC: <10 UG/ML (ref 10–20)
AST SERPL W P-5'-P-CCNC: 51 U/L (ref 13–39)
ATRIAL RATE: 65 BPM
BASOPHILS # BLD AUTO: 0.09 THOUSANDS/ÂΜL (ref 0–0.1)
BASOPHILS NFR BLD AUTO: 1 % (ref 0–1)
BILIRUB SERPL-MCNC: 0.35 MG/DL (ref 0.2–1)
BUN SERPL-MCNC: 12 MG/DL (ref 5–25)
C DIFF TOX GENS STL QL NAA+PROBE: NEGATIVE
CALCIUM SERPL-MCNC: 9 MG/DL (ref 8.4–10.2)
CAMPYLOBACTER DNA SPEC NAA+PROBE: NORMAL
CHLORIDE SERPL-SCNC: 104 MMOL/L (ref 96–108)
CO2 SERPL-SCNC: 27 MMOL/L (ref 21–32)
CREAT SERPL-MCNC: 0.73 MG/DL (ref 0.6–1.3)
EOSINOPHIL # BLD AUTO: 0.35 THOUSAND/ÂΜL (ref 0–0.61)
EOSINOPHIL NFR BLD AUTO: 4 % (ref 0–6)
ERYTHROCYTE [DISTWIDTH] IN BLOOD BY AUTOMATED COUNT: 13.2 % (ref 11.6–15.1)
ETHANOL SERPL-MCNC: <10 MG/DL
FERRITIN SERPL-MCNC: 780 NG/ML (ref 8–388)
GFR SERPL CREATININE-BSD FRML MDRD: 80 ML/MIN/1.73SQ M
GLUCOSE SERPL-MCNC: 69 MG/DL (ref 65–140)
HAV IGM SER QL: NORMAL
HBV CORE IGM SER QL: NORMAL
HBV SURFACE AG SER QL: NORMAL
HCT VFR BLD AUTO: 41.2 % (ref 34.8–46.1)
HCV AB SER QL: NORMAL
HGB BLD-MCNC: 12.9 G/DL (ref 11.5–15.4)
IMM GRANULOCYTES # BLD AUTO: 0.03 THOUSAND/UL (ref 0–0.2)
IMM GRANULOCYTES NFR BLD AUTO: 0 % (ref 0–2)
INR PPP: 1.31 (ref 0.84–1.19)
LYMPHOCYTES # BLD AUTO: 0.95 THOUSANDS/ÂΜL (ref 0.6–4.47)
LYMPHOCYTES NFR BLD AUTO: 12 % (ref 14–44)
MCH RBC QN AUTO: 30.4 PG (ref 26.8–34.3)
MCHC RBC AUTO-ENTMCNC: 31.3 G/DL (ref 31.4–37.4)
MCV RBC AUTO: 97 FL (ref 82–98)
MONOCYTES # BLD AUTO: 0.78 THOUSAND/ÂΜL (ref 0.17–1.22)
MONOCYTES NFR BLD AUTO: 9 % (ref 4–12)
NEUTROPHILS # BLD AUTO: 6.07 THOUSANDS/ÂΜL (ref 1.85–7.62)
NEUTS SEG NFR BLD AUTO: 74 % (ref 43–75)
NRBC BLD AUTO-RTO: 0 /100 WBCS
PLATELET # BLD AUTO: 196 THOUSANDS/UL (ref 149–390)
PMV BLD AUTO: 9.7 FL (ref 8.9–12.7)
POTASSIUM SERPL-SCNC: 3.7 MMOL/L (ref 3.5–5.3)
PROCALCITONIN SERPL-MCNC: <0.05 NG/ML
PROT SERPL-MCNC: 6.6 G/DL (ref 6.4–8.4)
PROTHROMBIN TIME: 16.4 SECONDS (ref 11.6–14.5)
QRS AXIS: 120 DEGREES
QRSD INTERVAL: 128 MS
QT INTERVAL: 482 MS
QTC INTERVAL: 512 MS
RBC # BLD AUTO: 4.25 MILLION/UL (ref 3.81–5.12)
SALMONELLA DNA SPEC QL NAA+PROBE: NORMAL
SHIGA TOXIN STX GENE SPEC NAA+PROBE: NORMAL
SHIGELLA DNA SPEC QL NAA+PROBE: NORMAL
SODIUM SERPL-SCNC: 136 MMOL/L (ref 135–147)
T WAVE AXIS: -40 DEGREES
TSH SERPL DL<=0.05 MIU/L-ACNC: 1.05 UIU/ML (ref 0.45–4.5)
VENTRICULAR RATE: 68 BPM
VIT B12 SERPL-MCNC: 1366 PG/ML (ref 100–900)
WBC # BLD AUTO: 8.27 THOUSAND/UL (ref 4.31–10.16)

## 2023-05-02 RX ORDER — CEFTRIAXONE 1 G/50ML
1000 INJECTION, SOLUTION INTRAVENOUS ONCE
Status: COMPLETED | OUTPATIENT
Start: 2023-05-02 | End: 2023-05-02

## 2023-05-02 RX ORDER — GABAPENTIN 400 MG/1
800 CAPSULE ORAL 3 TIMES DAILY
Status: DISCONTINUED | OUTPATIENT
Start: 2023-05-02 | End: 2023-05-04 | Stop reason: HOSPADM

## 2023-05-02 RX ORDER — CEFTRIAXONE 1 G/50ML
1000 INJECTION, SOLUTION INTRAVENOUS EVERY 24 HOURS
Status: DISCONTINUED | OUTPATIENT
Start: 2023-05-03 | End: 2023-05-04

## 2023-05-02 RX ORDER — ASPIRIN 81 MG/1
81 TABLET ORAL DAILY
Status: DISCONTINUED | OUTPATIENT
Start: 2023-05-02 | End: 2023-05-04 | Stop reason: HOSPADM

## 2023-05-02 RX ORDER — PRIMIDONE 250 MG/1
250 TABLET ORAL 2 TIMES DAILY
COMMUNITY
Start: 2023-04-11 | End: 2023-05-04

## 2023-05-02 RX ORDER — PAROXETINE HYDROCHLORIDE 20 MG/1
30 TABLET, FILM COATED ORAL DAILY
Status: DISCONTINUED | OUTPATIENT
Start: 2023-05-02 | End: 2023-05-04 | Stop reason: HOSPADM

## 2023-05-02 RX ORDER — LISINOPRIL 2.5 MG/1
2.5 TABLET ORAL DAILY
Status: DISCONTINUED | OUTPATIENT
Start: 2023-05-02 | End: 2023-05-04 | Stop reason: HOSPADM

## 2023-05-02 RX ORDER — EZETIMIBE 10 MG/1
10 TABLET ORAL DAILY
Status: DISCONTINUED | OUTPATIENT
Start: 2023-05-02 | End: 2023-05-04 | Stop reason: HOSPADM

## 2023-05-02 RX ORDER — ATORVASTATIN CALCIUM 40 MG/1
80 TABLET, FILM COATED ORAL
Status: DISCONTINUED | OUTPATIENT
Start: 2023-05-02 | End: 2023-05-04 | Stop reason: HOSPADM

## 2023-05-02 RX ORDER — DOCUSATE SODIUM 100 MG/1
100 CAPSULE, LIQUID FILLED ORAL 2 TIMES DAILY
Status: DISCONTINUED | OUTPATIENT
Start: 2023-05-02 | End: 2023-05-04 | Stop reason: HOSPADM

## 2023-05-02 RX ORDER — WARFARIN SODIUM 5 MG/1
5 TABLET ORAL
Status: DISCONTINUED | OUTPATIENT
Start: 2023-05-02 | End: 2023-05-04 | Stop reason: HOSPADM

## 2023-05-02 RX ORDER — ASCORBIC ACID 500 MG
500 TABLET ORAL DAILY
Status: DISCONTINUED | OUTPATIENT
Start: 2023-05-02 | End: 2023-05-04 | Stop reason: HOSPADM

## 2023-05-02 RX ORDER — ACETAMINOPHEN 325 MG/1
650 TABLET ORAL EVERY 6 HOURS PRN
Status: DISCONTINUED | OUTPATIENT
Start: 2023-05-02 | End: 2023-05-04 | Stop reason: HOSPADM

## 2023-05-02 RX ORDER — OXYCODONE HYDROCHLORIDE AND ACETAMINOPHEN 5; 325 MG/1; MG/1
1 TABLET ORAL EVERY 6 HOURS PRN
Status: DISCONTINUED | OUTPATIENT
Start: 2023-05-02 | End: 2023-05-04 | Stop reason: HOSPADM

## 2023-05-02 RX ORDER — ONDANSETRON 2 MG/ML
4 INJECTION INTRAMUSCULAR; INTRAVENOUS EVERY 6 HOURS PRN
Status: DISCONTINUED | OUTPATIENT
Start: 2023-05-02 | End: 2023-05-04 | Stop reason: HOSPADM

## 2023-05-02 RX ADMIN — EZETIMIBE 10 MG: 10 TABLET ORAL at 09:30

## 2023-05-02 RX ADMIN — PAROXETINE 30 MG: 20 TABLET, FILM COATED ORAL at 09:30

## 2023-05-02 RX ADMIN — Medication 12.5 MG: at 23:35

## 2023-05-02 RX ADMIN — GABAPENTIN 800 MG: 400 CAPSULE ORAL at 17:16

## 2023-05-02 RX ADMIN — OXYCODONE HYDROCHLORIDE AND ACETAMINOPHEN 500 MG: 500 TABLET ORAL at 09:30

## 2023-05-02 RX ADMIN — GABAPENTIN 800 MG: 400 CAPSULE ORAL at 09:30

## 2023-05-02 RX ADMIN — GABAPENTIN 800 MG: 400 CAPSULE ORAL at 23:35

## 2023-05-02 RX ADMIN — Medication 12.5 MG: at 10:48

## 2023-05-02 RX ADMIN — CEFTRIAXONE 1000 MG: 1 INJECTION, SOLUTION INTRAVENOUS at 00:34

## 2023-05-02 RX ADMIN — CEFTRIAXONE 1000 MG: 1 INJECTION, SOLUTION INTRAVENOUS at 23:35

## 2023-05-02 RX ADMIN — CYANOCOBALAMIN TAB 500 MCG 1000 MCG: 500 TAB at 09:30

## 2023-05-02 RX ADMIN — WARFARIN SODIUM 5 MG: 5 TABLET ORAL at 17:16

## 2023-05-02 RX ADMIN — LISINOPRIL 2.5 MG: 2.5 TABLET ORAL at 09:30

## 2023-05-02 RX ADMIN — Medication 12.5 MG: at 02:43

## 2023-05-02 RX ADMIN — ASPIRIN 81 MG: 81 TABLET, COATED ORAL at 09:30

## 2023-05-02 RX ADMIN — DOCUSATE SODIUM 100 MG: 100 CAPSULE ORAL at 09:30

## 2023-05-02 RX ADMIN — DOCUSATE SODIUM 100 MG: 100 CAPSULE ORAL at 17:16

## 2023-05-02 RX ADMIN — ATORVASTATIN CALCIUM 80 MG: 40 TABLET, FILM COATED ORAL at 23:35

## 2023-05-02 NOTE — OCCUPATIONAL THERAPY NOTE
Occupational Therapy Evaluation     Evaluation: 9411-6812  Treatment: [id number]    Patient Name: Tania Gavin  [id number] Date: 5/2/2023  Problem List  Principal Problem:    Acute cystitis without hematuria  Active Problems:    Chronic pain syndrome    Ambulatory dysfunction    Tremor, essential    Chronic atrial fibrillation (HCC)    CAD (coronary artery disease)    Transaminitis    Past Medical History  Past Medical History:   Diagnosis Date    Chronic atrial fibrillation ([facility] )     Myocardial infarction ([facility] )     Stroke ([facility] ) 2022    left sided deficit     Past Surgical History  Past Surgical History:   Procedure Laterality Date    BACK SURGERY      with hardware    CORONARY ARTERY BYPASS GRAFT      stents x 2 post CABG    GASTRIC BYPASS      HYSTERECTOMY      DE PRQ IMPLTJ NSTIM ELECTRODE ARRAY EPIDURAL Left 4/20/2021    Procedure: INSERTION THORACIC DORSAL COLUMN SPINAL CORD STIMULATOR PERCUTANEOUS W IMPLANTABLE PULSE GENERATOR, LEFT FLANK;  Surgeon: Nan Barney MD;  Location: [facility] OR;  Service: Neurosurgery         05/02/23 1020   Note Type   Note type Evaluation   Pain Assessment   Pain Assessment Tool 0-10   Pain Score No Pain   Restrictions/Precautions   Other Precautions Chair Alarm; Bed Alarm; Fall Risk;Pain   Home Living   Type of Home Mobile home  (2 ZACK B HR)   Home Layout One level;Performs ADLs on one level; Able to live on main level with bedroom/bathroom   Bathroom Shower/Tub Walk-in shower   Bathroom Toilet Raised   Bathroom Equipment Shower chair;Grab bars in shower;Grab bars around toilet   Home Equipment Walker;Cane   Additional Comments Pt reports living in a 1 floor mobile home with 2 ZACK  Pt ambulates with SPC or RW at baseline depending on pain level and tremors   Prior Function   Level of [place] Independent with ADLs; Independent with functional mobility; Independent with IADLS   Lives With Spouse   Receives Help From Family   IADLs Independent with driving; Independent with meal prep;Independent with medication management   Falls in the last 6 months 5 to 10   Vocational Retired   Comments Pt reports completing ADLs, IADLs, functional ambulation and community mobility + driving @ Mod I   ADL   Where Assessed Edge of bed   Grooming Assistance 5  Supervision/Setup   Grooming Deficit Setup   UB Dressing Assistance 5  Supervision/Setup    [address]  Minimal Assistance   LB Dressing Deficit Setup;Steadying; Requires assistive device for steadying;Verbal cueing;Supervision/safety; Increased time to complete;Pull up over hips   [place] Deficit Setup;Steadying;Verbal cueing;Supervison/safety; Increased time to complete;Grab bar use;Clothing management up;Clothing management down;Perineal hygiene   Additional Comments Pt completing ADL tasks while seated at EOB  UB Dressing @ S after set-up  LB Dressing @ Min A for balnace/safety while completing CM around waist with UB supported PRN  Pt donning B socks @ [address] with increased time PRN  please refer to treatment note for performance durign toileting nad grooming tasks   Bed Mobility   Supine to Sit 5  Supervision   Additional items Increased time required;Verbal cues   Additional Comments HOB flat, no bedrails   Transfers   Sit to Stand 4  Minimal assistance   Additional items Assist x 1; Increased time required;Verbal cues   Stand to Sit   [facility] Assist)   Additional items Assist x 1; Increased time required;Verbal cues   Stand pivot   (Steadying Assist)   Additional items Assist x 1; Increased time required;Verbal cues   Additional Comments Pt completing functional transfers wit huse of RW for UB support   Min A for STS and SPT @ Steadying Assist with increased time, vc'ing for RW management and BLE progression   Balance   Static Sitting Good   Dynamic Sitting Fair +   Static Standing Fair   Dynamic Standing Fair -   Activity Tolerance   Activity Tolerance Patient limited by fatigue;Patient limited by pain   Medical Staff Made Aware Spoke with PT, Lowell Tidwell   Nurse Made Aware Spoke with RN   RUE Assessment   RUE Assessment WFL   LUE Assessment   LUE Assessment WFL   Hand Function   Gross Motor Coordination Functional   Fine Motor Coordination Functional   Hand Function Comments Pt B ROM WFL however Pt with generalized weakness  unable to handle much resistance during MMT   Sensation   Light Touch No apparent deficits   Cognition   Overall Cognitive Status [facility]   Arousal/Participation Alert; Responsive; Cooperative   Attention Attends with cues to redirect   Orientation Level Oriented X4   Memory Within functional limits   Following Commands Follows one step commands without difficulty   Assessment   Limitation Decreased ADL status; Decreased UE strength;Decreased Safe judgement during ADL;Decreased endurance;Decreased self-care trans;Decreased high-level ADLs   Prognosis Good   Assessment Pt is a 78 y o  female, admitted to [address] [place] [address] 5/1/2023 d/t experiencing increased tremors in B hands  Dx: acute cystitis without hematuria  Pt with PMHx impacting their performance during ADL tasks, including: chronic a-fib, MI, CVA with L sided deficits  Prior to admission to the hospital Pt was performing ADLs without physical assistance  IADLs without physical assistance  Functional transfers/ambulation without physical assistance  Cognitive status was PTA was intact  OT order placed to assess Pt's ADLs, cognitive status, and performance during functional tasks in order to maximize safety and independence while making most appropriate d/c recommendations   Pt's clinical presentation is currently unstable/unpredictable given new onset deficits that effect Pt's occupational performance and ability to safely return to OF including decrease activity tolerance, decrease standing balance, decrease performance during ADL tasks, decrease safety awareness , decrease UB MS, decrease generalized strength, decrease activity engagement, decrease performance during functional transfers, steps to enter home, frequent falls, high fall risk and limited insight to deficits combined with medical complications of pain impacting overall mobility status, increased RR, incontinence, fear/retreat, need for input for mobility technique/safety and tremors and neuropathy  Personal factors affecting Pt at time of initial evaluation include: unable to perform caregiver support/tasks, step(s) to enter environment, advanced age, past experience, inability to perform current job functions, inability to perform IADLs, inability to perform ADLs, new need for AD, inability to navigate community distances, limited insight into impairments, decreased initiation and engagement, recent fall(s)/fall history and questionable non-compliance  Pt will benefit from continued skilled OT services to address deficits as defined above and to maximize level independence/participation during ADLs and functional tasks to facilitate return toward PLOF and improved quality of life  From an occupational therapy standpoint, recommendation at time of d/c would be post acute rehab  Should Pt decline rehab, please consider HHOT  Plan   Treatment Interventions ADL retraining;Functional transfer training;UE strengthening/ROM; Endurance training;Patient/family training;Equipment evaluation/education; Neuromuscular reeducation; Compensatory technique education;Continued evaluation; Energy conservation; Activityengagement   Goal Expiration Date 05/16/23   OT Treatment Day 1   OT Frequency 3-5x/wk   Recommendation   OT Discharge Recommendation Post acute rehabilitation services   AM-PAC Daily Activity Inpatient   Lower Body Dressing 3   Bathing 2   Toileting 3   Upper Body Dressing 3   Grooming 3   Eating 4   Daily Activity Raw Score 18   Daily Activity Standardized Score (Calc for Raw Score >=11) 38 66   AM-PAC Applied Cognition Inpatient   Following a Speech/Presentation 3   Understanding Ordinary Conversation 4   Taking Medications 3   Remembering Where Things Are Placed or Put Away 4   Remembering List of 4-5 Errands 4   Taking Care of Complicated Tasks 3   Applied Cognition Raw Score 21   Applied Cognition Standardized Score 44 3   Additional Treatment Session   Start Time 1035   End Time 1044   Treatment Assessment Pt seen for treatment session #1 this date  Pt alert and agreeable to participate at this time  Pt completing short distance ambulation @ Steadying Assist with increased time and vc'ing for asfety/technique and RW management  Pt completing otileting tasks @ Min A for balnace/safety and thoroughness during CM aroudn waist and thorough pericare  Steadying Assist for STS from toilet with UB supported from grab bar PRN  Pt then returning to recliner chair @ Steadying assist  S for grooming tasks after set-up  Pt seated OOB at end of session with call bell in reach,chair alarm intact and all needs met at this time  Additional Treatment Day 1     The patient's raw score on the -PAC Daily Activity Inpatient Short Form is 18  A raw score of less than 19 suggests the patient may benefit from discharge to post-acute rehabilitation services  Please refer to the recommendation of the Occupational Therapist for safe discharge planning  Pt goals to be met by 5/16/2023    Pt will demonstrate ability to complete LB dressing @ Mod I in order to increase safety and independence during meaningful tasks  Pt will demonstrate ability to mode/doff socks/shoes while sitting EOB @ Mod I in order to increase safety and independence during meaningful tasks  Pt will demonstrate ability to complete toileting tasks including CM and pericare @ Mod I in order to increase safety and independence during meaningful tasks    Pt will demonstrate ability to complete EOB, chair, toilet/commode transfers @ Mod I in order to increase performance and participation during functional tasks  Pt will demonstrate ability to stand for 1+ minutes while maintaining G balance with use of RW for UB support PRN  Pt will demonstrate ability to tolerate 30-35 minute OT session with no vc'ing for deep breathing or use of energy conservation techniques in order to increase activity tolerance during functional tasks  Pt will demonstrate Good carryover of use of energy conservation/compensatory strategies during ADLs and functional tasks in order to increase safety and reduce risk for falls  Pt will demonstrate Good attention and participation in continued evaluation of functional ambulation house hold distances in order to assist with safe d/c planning  Pt will attend to continued cognitive assessments 100% of the time in order to provide most appropriate d/c recommendations  Pt will follow 100% simple 2-step commands and be A&O x4 consistently with environmental cues to increase participation in functional activities  Pt will identify 3 areas of interest/hobbies and 1 intervention on how to incorporate into daily life in order to increase interaction with environment and peers as well as increase participation in meaningful tasks  Pt will demonstrate 100% carryover of BUE HEP in order to increase BUE MS and increase performance during functional tasks upon d/c home      Radha Rodriguez OTR/L

## 2023-05-02 NOTE — PLAN OF CARE
Problem: PHYSICAL THERAPY ADULT  Goal: Performs mobility at highest level of function for planned discharge setting  See evaluation for individualized goals  Description: Treatment/Interventions: ADL retraining, Functional transfer training, LE strengthening/ROM, Elevations, Therapeutic exercise, Endurance training, Patient/family training, Equipment eval/education, Bed mobility, Gait training, Compensatory technique education, Spoke to nursing, Spoke to case management, OT  Equipment Recommended:  (TBD by rehab)       See flowsheet documentation for full assessment, interventions and recommendations  Note: Prognosis: Good  Problem List: Decreased strength, Decreased endurance, Impaired balance, Decreased mobility, Decreased coordination, Decreased safety awareness  Assessment: Pt is a 78 y o  female seen for PT evaluation s/p admission to [address] [place] [address] on 5/1/2023 with Acute cystitis without hematuria  Order placed for PT services    Upon evaluation: Pt is presenting with impaired functional mobility due to decreased strength, decreased endurance, impaired balance, impaired coordination, gait deviations, altered sensation, decreased safety awareness, impaired judgment, and fall risk requiring  supervision assistance for bed mobility, minimal to steadying assistance for transfers, and minimal to steadying assistance for ambulation with RW   Pt's clinical presentation is currently unpredictable given the functional mobility deficits above, especially weakness, decreased endurance, impaired coordination, gait deviations, decreased activity tolerance, decreased functional mobility tolerance, altered sensation, decreased safety awareness, and impaired judgement, coupled with fall risks as indicated by AM-PAC 6-Clicks: 75/71 as well as hx of falls, impaired balance, polypharmacy, impaired coordination, impaired judgement, decreased safety awareness, and limited sensation/neuropathy and combined with medical complications of fear/retreat, need for input for mobility technique/safety and acute cystitis, transaminitis, essential tremor  Pt's PMHx and comorbidities that may affect physical performance and progress include: CAD s/p CABG, essential tremor, chronic pain syndrome with lumbar radiculopathy s/p laminectomy with spinal cord stimulator in place, Afib, neuropathy, h/o MI, h/o CVA with left residual deficits  Personal factors affecting pt at time of IE include: inaccessible home environment, step(s) to enter environment, limited home support, inability to perform IADLs, inability to perform ADLs, inability to navigate level surfaces without external assistance, inability to ambulate household distances, inability to navigate community distances and recent fall(s)/fall history  Pt will benefit from continued skilled PT services to address deficits as defined above and to maximize level of functional mobility to facilitate return toward PLOF and improved QOL  From PT/mobility standpoint, recommendation at time of d/c would be post acute rehab ([facility]) pending progress in order to reduce fall risk and maximize pt's functional independence and consistency with mobility in order to facilitate return to PLOF  Recommend trial with walker next 1-2 sessions and ther ex next 1-2 sessions  Barriers to Discharge: Decreased caregiver support, Inaccessible home environment  Barriers to Discharge Comments: requires assistance to complete mobility, decreased activity tolerance  PT Discharge Recommendation: Post acute rehabilitation services    See flowsheet documentation for full assessment

## 2023-05-02 NOTE — ASSESSMENT & PLAN NOTE
· Reporting recent falls (without injury or head strike per patient ) and difficulty ambulating  Lives at home, uses walkers     · UA showing acute cystitis which could be contributing  · CTH showing moderate chronic small vessel changes  · Patient also takes chronic opiates for chronic pain   · Continue to treat acute cystitis and assess for improvement  · Cx PT/OT/CM   · Fall precautions

## 2023-05-02 NOTE — PLAN OF CARE
Problem: Potential for Falls  Goal: Patient will remain free of falls  Description: INTERVENTIONS:  - Educate patient/family on patient safety including physical limitations  - Instruct patient to call for assistance with activity   - Consult OT/PT to assist with strengthening/mobility   - Keep Call bell within reach  - Keep bed low and locked with side rails adjusted as appropriate  - Keep care items and personal belongings within reach  - Initiate and maintain comfort rounds  - Make Fall Risk Sign visible to staff  - Offer Toileting every 2 Hours, in advance of need  - Initiate/Maintain bed alarm  - Obtain necessary fall risk management equipment  - Apply yellow socks and bracelet for high fall risk patients  - Consider moving patient to room near nurses station  Outcome: Progressing     Problem: MOBILITY - ADULT  Goal: Maintain or return to baseline ADL function  Description: INTERVENTIONS:  -  Assess patient's ability to carry out ADLs; assess patient's baseline for ADL function and identify physical deficits which impact ability to perform ADLs (bathing, care of mouth/teeth, toileting, grooming, dressing, etc )  - Assess/evaluate cause of self-care deficits   - Assess range of motion  - Assess patient's mobility; develop plan if impaired  - Assess patient's need for assistive devices and provide as appropriate  - Encourage maximum independence but intervene and supervise when necessary  - Involve family in performance of ADLs  - Assess for home care needs following discharge   - Consider OT consult to assist with ADL evaluation and planning for discharge  - Provide patient education as appropriate  Outcome: Progressing  Goal: Maintains/Returns to pre admission functional level  Description: INTERVENTIONS:  - Perform BMAT or MOVE assessment daily    - Set and communicate daily mobility goal to care team and patient/family/caregiver     - Collaborate with rehabilitation services on mobility goals if consulted  - Perform Range of Motion 3 times a day  - Reposition patient every 2 hours    - Dangle patient 3 times a day  - Stand patient 3 times a day  - Ambulate patient 3 times a day  - Out of bed to chair 3 times a day   - Out of bed for meals 3 times a day  - Out of bed for toileting  - Record patient progress and toleration of activity level   Outcome: Progressing     Problem: PAIN - ADULT  Goal: Verbalizes/displays adequate comfort level or baseline comfort level  Description: Interventions:  - Encourage patient to monitor pain and request assistance  - Assess pain using appropriate pain scale  - Administer analgesics based on type and severity of pain and evaluate response  - Implement non-pharmacological measures as appropriate and evaluate response  - Consider cultural and social influences on pain and pain management  - Notify physician/advanced practitioner if interventions unsuccessful or patient reports new pain  Outcome: Progressing     Problem: INFECTION - ADULT  Goal: Absence or prevention of progression during hospitalization  Description: INTERVENTIONS:  - Assess and monitor for signs and symptoms of infection  - Monitor lab/diagnostic results  - Monitor all insertion sites, i e  indwelling lines, tubes, and drains  - Monitor endotracheal if appropriate and nasal secretions for changes in amount and color  - [place] appropriate cooling/warming therapies per order  - Administer medications as ordered  - Instruct and encourage patient and family to use good hand hygiene technique  - Identify and instruct in appropriate isolation precautions for identified infection/condition  Outcome: Progressing  Goal: Absence of fever/infection during neutropenic period  Description: INTERVENTIONS:  - Monitor WBC    Outcome: Progressing     Problem: SAFETY ADULT  Goal: Patient will remain free of falls  Description: INTERVENTIONS:  - Educate patient/family on patient safety including physical limitations  - Instruct patient to call for assistance with activity   - Consult OT/PT to assist with strengthening/mobility   - Keep Call bell within reach  - Keep bed low and locked with side rails adjusted as appropriate  - Keep care items and personal belongings within reach  - Initiate and maintain comfort rounds  - Make Fall Risk Sign visible to staff  - Offer Toileting every 2 Hours, in advance of need  - Initiate/Maintain bed alarm  - Obtain necessary fall risk management equipment  - Apply yellow socks and bracelet for high fall risk patients  - Consider moving patient to room near nurses station  Outcome: Progressing  Goal: Maintain or return to baseline ADL function  Description: INTERVENTIONS:  -  Assess patient's ability to carry out ADLs; assess patient's baseline for ADL function and identify physical deficits which impact ability to perform ADLs (bathing, care of mouth/teeth, toileting, grooming, dressing, etc )  - Assess/evaluate cause of self-care deficits   - Assess range of motion  - Assess patient's mobility; develop plan if impaired  - Assess patient's need for assistive devices and provide as appropriate  - Encourage maximum independence but intervene and supervise when necessary  - Involve family in performance of ADLs  - Assess for home care needs following discharge   - Consider OT consult to assist with ADL evaluation and planning for discharge  - Provide patient education as appropriate  Outcome: Progressing  Goal: Maintains/Returns to pre admission functional level  Description: INTERVENTIONS:  - Perform BMAT or MOVE assessment daily    - Set and communicate daily mobility goal to care team and patient/family/caregiver  - Collaborate with rehabilitation services on mobility goals if consulted  - Perform Range of Motion 3 times a day  - Reposition patient every 2 hours    - Dangle patient 3 times a day  - Stand patient 3 times a day  - Ambulate patient 3 times a day  - Out of bed to chair 3 times a day   - Out of bed for meals 3 times a day  - Out of bed for toileting  - Record patient progress and toleration of activity level   Outcome: Progressing     Problem: DISCHARGE PLANNING  Goal: Discharge to home or other facility with appropriate resources  Description: INTERVENTIONS:  - Identify barriers to discharge w/patient and caregiver  - Arrange for needed discharge resources and transportation as appropriate  - Identify discharge learning needs (meds, wound care, etc )  - Arrange for interpretive services to assist at discharge as needed  - Refer to Case Management Department for coordinating discharge planning if the patient needs post-hospital services based on physician/advanced practitioner order or complex needs related to functional status, cognitive ability, or social support system  Outcome: Progressing     Problem: Knowledge Deficit  Goal: Patient/family/caregiver demonstrates understanding of disease process, treatment plan, medications, and discharge instructions  Description: Complete learning assessment and assess knowledge base    Interventions:  - Provide teaching at level of understanding  - Provide teaching via preferred learning methods  Outcome: Progressing

## 2023-05-02 NOTE — ASSESSMENT & PLAN NOTE
· Mild, present on admission   · No abdominal complaints   No recent medication changes    · Check hepatitis panel, ethanol, tylenol level   · Trend labs

## 2023-05-02 NOTE — ED PROVIDER NOTES
History  Chief Complaint   Patient presents with    Tremors     Pt c/o tremors throughout body all day long  No tremors noted on triage  Pt says that she fell she thinks yesterday but did not hurt anything and denies head strike  Pt does take eliquis     79-year-old female presents to the ED for evaluation of generalized weakness and tremors today  Patient states that she has had diarrhea for several days has been weak has fallen but denies any head strike and states that she has had very poor appetite over the past several days  She denies any fever chills, denies any chest pain denies any respiratory complaints but does state that she has had diarrhea since Friday  Patient states that she lives with her  however he has no weakness or illness or diarrhea  Prior to Admission Medications   Prescriptions Last Dose Informant Patient Reported? Taking?    BETA CAROTENE PO 2023  Yes Yes   Sig: Take 7,500 mcg by mouth daily   IRON, FERROUS SULFATE, PO 2023  Yes Yes   Sig: Take 65 mg by mouth daily   Multiple Vitamins-Iron (Multi-Vitamin/Iron) TABS 2023  Yes Yes   Sig: Take by mouth   Omega-3 Fatty Acids (FISH OIL PO) 2023  Yes Yes   Sig: Take by mouth daily   PARoxetine (PAXIL) 30 mg tablet 2023  Yes Yes   Sig: Take 30 mg by mouth daily   acetaminophen (TYLENOL) 500 mg tablet Unknown  Yes No   Sig: Take 500 mg by mouth every 4 (four) hours as needed   ascorbic acid (VITAMIN C) 500 MG tablet 2023  Yes Yes   Sig: Take 500 mg by mouth daily   aspirin (ECOTRIN LOW STRENGTH) 81 mg EC tablet 2023  Yes Yes   Sig: Take 81 mg by mouth daily   atorvastatin (LIPITOR) 80 mg tablet 2023  Yes Yes   Sig: TAKE 1 TABLET NIGHTLY   calcium citrate-vitamin D (CITRACAL+D) 315-200 MG-UNIT per tablet 2023  Yes Yes   Si tablet 2x daily   co-enzyme Q-10 30 MG capsule 2023  Yes Yes   Sig: Take 100 mg by mouth daily   docusate sodium (COLACE) 100 mg capsule 2023  Yes Yes   Sig: Take 100 mg by mouth 2 (two) times a day   ezetimibe (ZETIA) 10 mg tablet 5/1/2023  Yes Yes   Sig: TAKE 1 TABLET DAILY   gabapentin (NEURONTIN) 800 mg tablet 5/1/2023  Yes Yes   Sig: Take 800 mg by mouth 3 (three) times a day   lisinopril (ZESTRIL) 2 5 mg tablet 5/1/2023  Yes Yes   Sig: Take 2 5 mg by mouth daily   metoprolol succinate (TOPROL-XL) 25 mg 24 hr tablet Not Taking  Yes No   Sig: Take 12 5 mg by mouth daily   Patient not taking: Reported on 5/2/2023   metoprolol tartrate (LOPRESSOR) 25 mg tablet 5/1/2023  Yes Yes   Sig: Take 12 5 mg by mouth every 12 (twelve) hours   naloxone (NARCAN) 4 mg/0 1 mL nasal spray   No No   Sig: Administer 1 spray into a nostril  If no response after 2-3 minutes, give another dose in the other nostril using a new spray     Patient not taking: Reported on 5/4/2021   nitroglycerin (NITROSTAT) 0 4 mg SL tablet Unknown  Yes No   Patient not taking: Reported on 11/15/2022   oxyCODONE-acetaminophen (PERCOCET)  mg per tablet   No No   Sig: Take 1 tablet by mouth every 6 (six) hours as needed for moderate painMax Daily Amount: 4 tablets   primidone (MYSOLINE) 250 mg tablet   Yes Yes   Sig: Take 250 mg by mouth 2 (two) times a day   vitamin B-12 (VITAMIN B-12) 1,000 mcg tablet 5/1/2023  Yes Yes   Sig: Take 1,000 mcg by mouth daily   warfarin (COUMADIN) 5 mg tablet 5/1/2023  Yes Yes   Sig: Take 5 mg by mouth daily      Facility-Administered Medications: None       Past Medical History:   Diagnosis Date    Chronic atrial fibrillation ([facility] [address] )     Myocardial infarction ([facility] [address] )     Stroke ([facility] [address] ) 2022    left sided deficit       Past Surgical History:   Procedure Laterality Date    BACK SURGERY      with hardware    CORONARY ARTERY BYPASS GRAFT      stents x 2 post CABG    GASTRIC BYPASS      HYSTERECTOMY      TX PRQ IMPLTJ NSTIM ELECTRODE ARRAY EPIDURAL Left 4/20/2021    Procedure: INSERTION THORACIC DORSAL COLUMN SPINAL CORD STIMULATOR PERCUTANEOUS W IMPLANTABLE PULSE GENERATOR, LEFT FLANK;  Surgeon: Signe Mcburney, MD;  Location:  MAIN OR;  Service: Neurosurgery       History reviewed  No pertinent family history  I have reviewed and agree with the history as documented  E-Cigarette/Vaping    E-Cigarette Use Never User      E-Cigarette/Vaping Substances     Social History     Tobacco Use    Smoking status: Never    Smokeless tobacco: Never   Vaping Use    Vaping Use: Never used   Substance Use Topics    Alcohol use: Not Currently    Drug use: Never       Review of Systems   Constitutional: Positive for activity change, appetite change and fatigue  Negative for chills and fever  HENT: Negative for ear pain and sore throat  Eyes: Negative for pain and visual disturbance  Respiratory: Negative  Negative for cough and shortness of breath  Cardiovascular: Negative  Negative for chest pain and palpitations  Gastrointestinal: Positive for diarrhea  Negative for abdominal pain and vomiting  Genitourinary: Negative for dysuria and hematuria  Musculoskeletal: Positive for gait problem  Negative for arthralgias and back pain  Skin: Negative for color change and rash  Neurological: Positive for tremors and weakness  Negative for seizures and syncope  All other systems reviewed and are negative  Physical Exam  Physical Exam  Vitals and nursing note reviewed  Constitutional:       General: She is not in acute distress  Appearance: Normal appearance  She is well-developed and normal weight  She is ill-appearing  HENT:      Head: Normocephalic and atraumatic  Right Ear: External ear normal       Left Ear: External ear normal       Nose: Nose normal       Mouth/Throat:      Mouth: Mucous membranes are dry  Pharynx: No posterior oropharyngeal erythema  Eyes:      Extraocular Movements: Extraocular movements intact  Conjunctiva/sclera: Conjunctivae normal    Neck:      Vascular: No carotid bruit     Cardiovascular:      Rate and Rhythm: Normal rate and regular rhythm  Pulses: Normal pulses  Heart sounds: Normal heart sounds  No murmur heard  Pulmonary:      Effort: Pulmonary effort is normal  No respiratory distress  Breath sounds: Normal breath sounds  No stridor  No wheezing, rhonchi or rales  Chest:      Chest wall: No tenderness  Abdominal:      General: Abdomen is flat  Bowel sounds are normal  There is no distension  Palpations: Abdomen is soft  There is no mass  Tenderness: There is no abdominal tenderness  There is no right CVA tenderness, left CVA tenderness, guarding or rebound  Hernia: No hernia is present  Musculoskeletal:         General: No swelling, tenderness, deformity or signs of injury  Normal range of motion  Cervical back: Normal range of motion and neck supple  No rigidity or tenderness  Right lower leg: No edema  Left lower leg: No edema  Lymphadenopathy:      Cervical: No cervical adenopathy  Skin:     General: Skin is warm and dry  Capillary Refill: Capillary refill takes less than 2 seconds  Coloration: Skin is not jaundiced or pale  Findings: No bruising, erythema, lesion or rash  Neurological:      General: No focal deficit present  Mental Status: She is alert and oriented to person, place, and time  Mental status is at baseline  Cranial Nerves: No cranial nerve deficit  Sensory: No sensory deficit  Motor: Weakness present        Coordination: Coordination abnormal       Gait: Gait abnormal       Deep Tendon Reflexes: Reflexes normal       Comments: Tremors in bilateral upper and lower extremities with activity, unsteady gait, ataxic     Psychiatric:         Mood and Affect: Mood normal          Vital Signs  ED Triage Vitals   Temperature Pulse Respirations Blood Pressure SpO2   05/02/23 0218 05/01/23 2220 05/01/23 2220 05/01/23 2220 05/01/23 2220   97 7 °F (36 5 °C) 75 18 111/60 96 %      Temp src Heart Rate Source Patient Position - Orthostatic VS BP Location FiO2 (%)   -- 05/01/23 2220 05/01/23 2220 05/01/23 2220 --    Monitor Lying Left arm       Pain Score       05/01/23 2200       No Pain           Vitals:    05/02/23 0030 05/02/23 0100 05/02/23 0200 05/02/23 0218   BP: 104/74 126/58 128/63 151/75   Pulse: 74 67 72 75   Patient Position - Orthostatic VS:             Visual Acuity  Visual Acuity    Flowsheet Row Most Recent Value   L Pupil Size (mm) 5   R Pupil Size (mm) 5          ED Medications  Medications   ascorbic acid (VITAMIN C) tablet 500 mg (has no administration in time range)   aspirin (ECOTRIN LOW STRENGTH) EC tablet 81 mg (has no administration in time range)   docusate sodium (COLACE) capsule 100 mg (has no administration in time range)   gabapentin (NEURONTIN) capsule 800 mg (has no administration in time range)   lisinopril (ZESTRIL) tablet 2 5 mg (has no administration in time range)   metoprolol tartrate (LOPRESSOR) partial tablet 12 5 mg (12 5 mg Oral Given 5/2/23 0243)   PARoxetine (PAXIL) tablet 30 mg (has no administration in time range)   cyanocobalamin (VITAMIN B-12) tablet 1,000 mcg (has no administration in time range)   oxyCODONE-acetaminophen (PERCOCET) 5-325 mg per tablet 1 tablet (has no administration in time range)   atorvastatin (LIPITOR) tablet 80 mg (has no administration in time range)   ezetimibe (ZETIA) tablet 10 mg (has no administration in time range)   cefTRIAXone (ROCEPHIN) IVPB (premix in dextrose) 1,000 mg 50 mL (has no administration in time range)   acetaminophen (TYLENOL) tablet 650 mg (has no administration in time range)   cefTRIAXone (ROCEPHIN) IVPB (premix in dextrose) 1,000 mg 50 mL (0 mg Intravenous Stopped 5/2/23 0107)       Diagnostic Studies  Results Reviewed     Procedure Component Value Units Date/Time    Procalcitonin [[id number]]  (Normal) Collected: 05/01/23 2314    Lab Status: Final result Specimen: Blood from Arm, Right Updated: 05/02/23 0303     Procalcitonin <0 05 ng/ml     Acetaminophen "level-\"If concentration is detectable, please discuss with medical  on call  \" [[id number]]  (Abnormal) Collected: 05/01/23 2313    Lab Status: Final result Specimen: Blood from Arm, Right Updated: 05/02/23 0244     Acetaminophen Level <10 ug/mL     Magnesium [[id number]]  (Normal) Collected: 05/01/23 2313    Lab Status: Final result Specimen: Blood from Arm, Right Updated: 05/01/23 2356     Magnesium 2 1 mg/dL     Phosphorus [[id number]]  (Normal) Collected: 05/01/23 2313    Lab Status: Final result Specimen: Blood from Arm, Right Updated: 05/01/23 2356     Phosphorus 3 9 mg/dL     Urine Microscopic [[id number]]  (Abnormal) Collected: 05/01/23 2312    Lab Status: Final result Specimen: Urine, Straight Cath Updated: 05/01/23 2330     RBC, UA 4-10 /hpf      WBC, UA 30-50 /hpf      Epithelial Cells Occasional /hpf      Bacteria, UA Innumerable /hpf      WBC Clumps Present    Urine culture [[id number]] Collected: 05/01/23 2312    Lab Status:  In process Specimen: Urine, Straight Cath Updated: 05/01/23 2330    HS Troponin 0hr (reflex protocol) [[id number]]  (Normal) Collected: 05/01/23 2254    Lab Status: Final result Specimen: Blood from Arm, Right Updated: 05/01/23 2324     hs TnI 0hr 10 ng/L     UA w Reflex to Microscopic w Reflex to Culture [[id number]]  (Abnormal) Collected: 05/01/23 2312    Lab Status: Final result Specimen: Urine, Straight Cath Updated: 05/01/23 2321     Color, UA Yellow     Clarity, UA Clear     Specific Gravity, UA 1 015     pH, UA 7 0     Leukocytes, UA Moderate     Nitrite, UA Positive     Protein, UA Negative mg/dl      Glucose, UA Negative mg/dl      Ketones, UA Negative mg/dl      Urobilinogen, UA 0 2 E U /dl      Bilirubin, UA Negative     Occult Blood, UA Trace-Intact    CMP [[id number]]  (Abnormal) Collected: 05/01/23 2254    Lab Status: Final result Specimen: Blood from Arm, Right Updated: 05/01/23 2320     Sodium 135 mmol/L      Potassium 5 1 mmol/L      Chloride 99 mmol/L      CO2 " 31 mmol/L      ANION GAP 5 mmol/L      BUN 12 mg/dL      Creatinine 0 62 mg/dL      Glucose 95 mg/dL      Calcium 9 9 mg/dL      AST 55 U/L      ALT 89 U/L      Alkaline Phosphatase 133 U/L      Total Protein 7 2 g/dL      Albumin 4 2 g/dL      Total Bilirubin 0 56 mg/dL      eGFR 88 ml/min/1 73sq m     Narrative:      National Kid[employer] guidelines for Chronic Kidney Disease (CKD):     Stage 1 with normal or high GFR (GFR > 90 mL/min/1 73 square meters)    Stage 2 Mild CKD (GFR = 60-89 mL/min/1 73 square meters)    Stage 3A Moderate CKD (GFR = 45-59 mL/min/1 73 square meters)    Stage 3B Moderate CKD (GFR = 30-44 mL/min/1 73 square meters)    Stage 4 Severe CKD (GFR = 15-29 mL/min/1 73 square meters)    Stage 5 End Stage CKD (GFR <15 mL/min/1 73 square meters)  Note: GFR calculation is accurate only with a steady state creatinine    Lipase [[id number]]  (Normal) Collected: 05/01/23 2254    Lab Status: Final result Specimen: Blood from Arm, Right Updated: 05/01/23 2320     Lipase 27 u/L     CBC and differential [[id number]] Collected: 05/01/23 2254    Lab Status: Final result Specimen: Blood from Arm, Right Updated: 05/01/23 2307     WBC 8 70 Thousand/uL      RBC 4 48 Million/uL      Hemoglobin 13 9 g/dL      Hematocrit 43 0 %      MCV 96 fL      MCH 31 0 pg      MCHC 32 3 g/dL      RDW 13 0 %      MPV 10 0 fL      Platelets 742 Thousands/uL      nRBC 0 /100 WBCs      Neutrophils Relative 69 %      Immat GRANS % 0 %      Lymphocytes Relative 15 %      Monocytes Relative 12 %      Eosinophils Relative 3 %      Basophils Relative 1 %      Neutrophils Absolute 6 01 Thousands/µL      Immature Grans Absolute 0 02 Thousand/uL      Lymphocytes Absolute 1 26 Thousands/µL      Monocytes Absolute 1 07 Thousand/µL      Eosinophils Absolute 0 27 Thousand/µL      Basophils Absolute 0 07 Thousands/µL     Fingerstick Glucose (POCT) [[id number]]  (Normal) Collected: 05/01/23 2222    Lab Status: Final result Updated: 05/01/23 2225     POC Glucose 102 mg/dl                  CT head without contrast   Final Result by Chery Louis MD (05/02 6111)      No acute intracranial abnormality  Moderate chronic small vessel ischemic changes  If symptoms persist, an MRI brain could be performed for further evaluation  Procedures  Procedures         ED Course  ED Course as of 05/02/23 0342   Tue May 02, 2023   0103 Patient has a urinary tract infection based on the lab and work-up  Her CT head shows no acute findings I started her on Rocephin and fluid hydration  Case was discussed with the hospitalist and she will be admitted  SBIRT 20yo+    Flowsheet Row Most Recent Value   Initial Alcohol Screen: US AUDIT-C     1  How often do you have a drink containing alcohol? 0 Filed at: 05/01/2023 2245   3b  FEMALE Any Age, or MALE 65+: How often do you have 4 or more drinks on one occassion? 0 Filed at: 05/01/2023 2245   Audit-C Score 0 Filed at: 05/01/2023 2245   RADHA: How many times in the past year have you    Used an illegal drug or used a prescription medication for non-medical reasons? Never Filed at: 05/01/2023 2245                    Medical Decision Making  DDx: UTI, dehydration, electrolyte abnormality intracranial bleed, neoplasm    Amount and/or Complexity of Data Reviewed  Labs: ordered  Radiology: ordered  Risk  Prescription drug management            Disposition  Final diagnoses:   UTI (urinary tract infection)   Gait instability     Time reflects when diagnosis was documented in both MDM as applicable and the Disposition within this note     Time User Action Codes Description Comment    5/2/2023  1:28 AM Salo Hernandez [N39 0] UTI (urinary tract infection)     5/2/2023  1:29 AM Salo Hernandez [R26 81] Gait instability       ED Disposition     ED Disposition   Admit    Condition   Stable    Date/Time   Tue May 2, 2023  1:29 AM    Comment Follow-up Information    None         Current Discharge Medication List      CONTINUE these medications which have NOT CHANGED    Details   ascorbic acid (VITAMIN C) 500 MG tablet Take 500 mg by mouth daily      aspirin (ECOTRIN LOW STRENGTH) 81 mg EC tablet Take 81 mg by mouth daily      atorvastatin (LIPITOR) 80 mg tablet TAKE 1 TABLET NIGHTLY      BETA CAROTENE PO Take 7,500 mcg by mouth daily      calcium citrate-vitamin D (CITRACAL+D) 315-200 MG-UNIT per tablet 1 tablet 2x daily      co-enzyme Q-10 30 MG capsule Take 100 mg by mouth daily      docusate sodium (COLACE) 100 mg capsule Take 100 mg by mouth 2 (two) times a day      ezetimibe (ZETIA) 10 mg tablet TAKE 1 TABLET DAILY      gabapentin (NEURONTIN) 800 mg tablet Take 800 mg by mouth 3 (three) times a day      IRON, FERROUS SULFATE, PO Take 65 mg by mouth daily      lisinopril (ZESTRIL) 2 5 mg tablet Take 2 5 mg by mouth daily      metoprolol tartrate (LOPRESSOR) 25 mg tablet Take 12 5 mg by mouth every 12 (twelve) hours      Multiple Vitamins-Iron (Multi-Vitamin/Iron) TABS Take by mouth      Omega-3 Fatty Acids (FISH OIL PO) Take by mouth daily      PARoxetine (PAXIL) 30 mg tablet Take 30 mg by mouth daily      primidone (MYSOLINE) 250 mg tablet Take 250 mg by mouth 2 (two) times a day      vitamin B-12 (VITAMIN B-12) 1,000 mcg tablet Take 1,000 mcg by mouth daily      warfarin (COUMADIN) 5 mg tablet Take 5 mg by mouth daily      acetaminophen (TYLENOL) 500 mg tablet Take 500 mg by mouth every 4 (four) hours as needed      metoprolol succinate (TOPROL-XL) 25 mg 24 hr tablet Take 12 5 mg by mouth daily      naloxone (NARCAN) 4 mg/0 1 mL nasal spray Administer 1 spray into a nostril  If no response after 2-3 minutes, give another dose in the other nostril using a new spray    Qty: 1 each, Refills: 1    Associated Diagnoses: Opiate use      nitroglycerin (NITROSTAT) 0 4 mg SL tablet       oxyCODONE-acetaminophen (PERCOCET)  mg per tablet Take 1 tablet by mouth every 6 (six) hours as needed for moderate painMax Daily Amount: 4 tablets  Qty: 30 tablet, Refills: 0    Associated Diagnoses: Postoperative state; Postoperative pain after spinal surgery             No discharge procedures on file      PDMP Review       Value Time User    PDMP Reviewed  Yes 5/2/2023  2:20  [facility]          ED Provider  Electronically Signed by           Chrissy Batista DO  05/02/23 1607

## 2023-05-02 NOTE — PLAN OF CARE
Problem: PHYSICAL THERAPY ADULT  Goal: Performs mobility at highest level of function for planned discharge setting  See evaluation for individualized goals  Description: Treatment/Interventions: ADL retraining, Functional transfer training, LE strengthening/ROM, Elevations, Therapeutic exercise, Endurance training, Patient/family training, Equipment eval/education, Bed mobility, Gait training, Compensatory technique education, Spoke to nursing, Spoke to case management, OT  Equipment Recommended:  (TBD by rehab)       See flowsheet documentation for full assessment, interventions and recommendations  3/3/9477 [id number] by Khalif Lopez PT  Note: Prognosis: Good  Problem List: Decreased strength, Decreased endurance, Impaired balance, Decreased mobility, Decreased coordination, Decreased safety awareness  Pt tolerated sesison fairly well  She was able to complete sit to stand transfer with decreased assistance and ambulate increased distance with decreased assistance, but does require increased verbal cues for saftey with RW and improved gait pattern  She requires increased time to complete mobility  She is limited by decreased strength, balance, endurance  She will continue to benefit from PT services to maximize LOF  Barriers to Discharge: Decreased caregiver support, Inaccessible home environment  Barriers to Discharge Comments: requires assistance to complete mobility, decreased activity tolerance  PT Discharge Recommendation: Post acute rehabilitation services    See flowsheet documentation for full assessment

## 2023-05-02 NOTE — CASE MANAGEMENT
Case Management Assessment & Discharge Planning Note    Patient name Garfield Hoover  Location /-17 MRN [id number]  : 1945 Date 2023       Current Admission Date: 2023  Current Admission Diagnosis:Acute cystitis without hematuria   Patient Active Problem List    Diagnosis Date Noted    Acute cystitis without hematuria 2023    Ambulatory dysfunction 2023    Tremor, essential 2023    Chronic atrial fibrillation (HCC)     CAD (coronary artery disease)     Transaminitis     Status post insertion of spinal cord stimulator 2021    Postlaminectomy syndrome 2021    Lumbar radiculopathy 2021    Chronic pain syndrome 2021      LOS (days): 0  Geometric Mean LOS (GMLOS) (days): 2 80  Days to GMLOS:2 2     OBJECTIVE:    Risk of Unplanned Readmission Score: 12 53     CM met with patient at the bedside,baseline information was obtained  CM discussed the role of CM in helping the patient develop a discharge plan and assist the patient in carrying out their plan  Current admission status: Inpatient       Preferred Pharmacy:   [address] [place] [address] [address] [place] [address],[address]  [address] [place] [id number]  Phone: [phone] Fax: [phone]    Primary Care Provider: Cheyenne Dewitt MD    Primary Insurance: MEDICARE  Secondary Insurance: BLUE CROSS    ASSESSMENT:  Active Health Care Proxies    There are no active Health Care Proxies on file         Advance Directives  Does patient have a [address] [place] [address] Avenue?: Yes  Does patient have Advance Directives?: Yes  Advance Directives: Living will  Primary Contact: Ricci Cousin         Readmission Root Cause  30 Day Readmission: No    Patient Information  Admitted from[de-identified] Home  Mental Status: Alert  During Assessment patient was accompanied by: Not accompanied during assessment  Assessment information provided by[de-identified] Patient  Primary Caregiver: Self  Support Systems: Self, Spouse/significant other  [place] of Residence: [address] [facility] [address] do you live in?: [facility] entry access options   Select all that apply : Stairs  Number of steps to enter home : 2  Do the steps have railings?: Yes  Type of Current Residence: [facility]  In the last 12 months, was there a time when you were not able to pay the mortgage or rent on time?: No  In the last 12 months, how many places have you lived?: 1  In the last 12 months, was there a time when you did not have a steady place to sleep or slept in a shelter (including now)?: No  Homeless/housing insecurity resource given?: N/A  Living Arrangements: Lives w/ Spouse/significant other  Is patient a ?: No    Activities of Daily Living Prior to Admission  Functional Status: Independent  Completes ADLs independently?: Yes  Ambulates independently?: Yes  Does patient use assisted devices?: Yes  Assisted Devices (DME) used: Fatemeh Symone (elevated toilet, grab bars)  Does patient currently own DME?: Yes  What DME does the patient currently own?: Shannon Rivers, Other (Comment) (elevated toilet, grab bars)  Does patient have a history of Outpatient Therapy (PT/OT)?: No  Does the patient have a history of Short-Term Rehab?: Yes  Does patient have a history of HHC?: No  [facility]         Patient Information Continued  Income Source: Pension/longterm  Does patient have prescription coverage?: Yes  Within the past 12 months, you worried that your food would run out before you got the money to buy more : Never true  Within the past 12 months, the food you bought just didn't last and you didn't have money to get more : Never true  Food insecurity resource given?: N/A  Does patient receive dialysis treatments?: No  Does patient have a history of substance abuse?: No  Does patient have a history of Mental Health Diagnosis?: No         Means of Transportation  Means of Transport to \[facility]\""[de-identified] Drives Self  In the past 12 months, has lack of transportation kept you from medical appointments or from getting medications?: No  In the past 12 months, has lack of transportation kept you from meetings, work, or from getting things needed for daily living?: No  Was application for public transport provided?: N/A        DISCHARGE DETAILS:    Discharge planning discussed with[de-identified] patient  Freedom of Choice: Yes  Comments - Freedom of Choice: Pt stated that she would prefer to go to [facility] for STR     Were Treatment Team discharge recommendations reviewed with patient/caregiver?: Yes  Did patient/caregiver verbalize understanding of patient care needs?: Yes  Were patient/caregiver advised of the risks associated with not following Treatment Team discharge recommendations?: Yes         [address]         Is the patient interested in [facility] at discharge?: No    DME Referral Provided  Referral made for DME?: No    Other Referral/Resources/Interventions Provided:  Interventions: Short Term Rehab         Treatment Team Recommendation: Short Term Rehab  Discharge Destination Plan[de-identified] Short Term Rehab      CM at bedside to discuss post discharge options  Pt agreeable to STR recommendation  Pt stated that she would prefer [place] [address]  first choice and [facility] as a second choice  Referral placed in [address] [place] [address]

## 2023-05-02 NOTE — ASSESSMENT & PLAN NOTE
· Continue metoprolol 12 5 mg BID for rate control   · Patient reporting she switched to coumadin due to cost  Has been taking coumadin 5mg daily  · Will check PT/INR and dose accordingly, INR goal 2-3

## 2023-05-02 NOTE — PLAN OF CARE
Problem: Potential for Falls  Goal: Patient will remain free of falls  Description: INTERVENTIONS:  - Educate patient/family on patient safety including physical limitations  - Instruct patient to call for assistance with activity   - Consult OT/PT to assist with strengthening/mobility   - Keep Call bell within reach  - Keep bed low and locked with side rails adjusted as appropriate  - Keep care items and personal belongings within reach  - Initiate and maintain comfort rounds  - Make Fall Risk Sign visible to staff    - Apply yellow socks and bracelet for high fall risk patients  - Consider moving patient to room near nurses station  Outcome: Progressing     Problem: MOBILITY - ADULT  Goal: Maintain or return to baseline ADL function  Description: INTERVENTIONS:  -  Assess patient's ability to carry out ADLs; assess patient's baseline for ADL function and identify physical deficits which impact ability to perform ADLs (bathing, care of mouth/teeth, toileting, grooming, dressing, etc )  - Assess/evaluate cause of self-care deficits   - Assess range of motion  - Assess patient's mobility; develop plan if impaired  - Assess patient's need for assistive devices and provide as appropriate  - Encourage maximum independence but intervene and supervise when necessary  - Involve family in performance of ADLs  - Assess for home care needs following discharge   - Consider OT consult to assist with ADL evaluation and planning for discharge  - Provide patient education as appropriate  Outcome: Progressing     Problem: INFECTION - ADULT  Goal: Absence or prevention of progression during hospitalization  Description: INTERVENTIONS:  - Assess and monitor for signs and symptoms of infection  - Monitor lab/diagnostic results  - Monitor all insertion sites, i e  indwelling lines, tubes, and drains  - Monitor endotracheal if appropriate and nasal secretions for changes in amount and color  - [place] appropriate cooling/warming therapies per order  - Administer medications as ordered  - Instruct and encourage patient and family to use good hand hygiene technique  - Identify and instruct in appropriate isolation precautions for identified infection/condition  Outcome: Progressing     Problem: PAIN - ADULT  Goal: Verbalizes/displays adequate comfort level or baseline comfort level  Description: Interventions:  - Encourage patient to monitor pain and request assistance  - Assess pain using appropriate pain scale  - Administer analgesics based on type and severity of pain and evaluate response  - Implement non-pharmacological measures as appropriate and evaluate response  - Consider cultural and social influences on pain and pain management  - Notify physician/advanced practitioner if interventions unsuccessful or patient reports new pain  Outcome: Progressing

## 2023-05-02 NOTE — ASSESSMENT & PLAN NOTE
"· Reporting tremor has been ongoing for \"months\"  · Per chart review , follows with neurology  · CTH showing moderate chronic small vessel changes  · Slight tremor bilateral hands on exam   No focal deficit    ·  continue outpatient follow-up  "

## 2023-05-02 NOTE — ASSESSMENT & PLAN NOTE
· Presents from home with reports of tremors and ambulatory dysfunction     · UA found to be abnormal showing pyuria  · Does not meet sepsis criteria  · Follow-up urine culture, continue Rocephin  · Urine retention protocol

## 2023-05-02 NOTE — PLAN OF CARE
Problem: OCCUPATIONAL THERAPY ADULT  Goal: Performs self-care activities at highest level of function for planned discharge setting  See evaluation for individualized goals  Description: Treatment Interventions: ADL retraining, Functional transfer training, UE strengthening/ROM, Endurance training, Patient/family training, Equipment evaluation/education, Neuromuscular reeducation, Compensatory technique education, Continued evaluation, Energy conservation, Activityengagement          See flowsheet documentation for full assessment, interventions and recommendations  Note: Limitation: Decreased ADL status, Decreased UE strength, Decreased Safe judgement during ADL, Decreased endurance, Decreased self-care trans, Decreased high-level ADLs  Prognosis: Good  Assessment: Pt is a 78 y o  female, admitted to [address] [place] [address] 5/1/2023 d/t experiencing increased tremors in B hands  Dx: acute cystitis without hematuria  Pt with PMHx impacting their performance during ADL tasks, including: chronic a-fib, MI, CVA with L sided deficits  Prior to admission to the hospital Pt was performing ADLs without physical assistance  IADLs without physical assistance  Functional transfers/ambulation without physical assistance  Cognitive status was PTA was intact  OT order placed to assess Pt's ADLs, cognitive status, and performance during functional tasks in order to maximize safety and independence while making most appropriate d/c recommendations   Pt's clinical presentation is currently unstable/unpredictable given new onset deficits that effect Pt's occupational performance and ability to safely return to OF including decrease activity tolerance, decrease standing balance, decrease performance during ADL tasks, decrease safety awareness , decrease UB MS, decrease generalized strength, decrease activity engagement, decrease performance during functional transfers, steps to enter home, frequent falls, high fall risk and limited insight to deficits combined with medical complications of pain impacting overall mobility status, increased RR, incontinence, fear/retreat, need for input for mobility technique/safety and tremors and neuropathy  Personal factors affecting Pt at time of initial evaluation include: unable to perform caregiver support/tasks, step(s) to enter environment, advanced age, past experience, inability to perform current job functions, inability to perform IADLs, inability to perform ADLs, new need for AD, inability to navigate community distances, limited insight into impairments, decreased initiation and engagement, recent fall(s)/fall history and questionable non-compliance  Pt will benefit from continued skilled OT services to address deficits as defined above and to maximize level independence/participation during ADLs and functional tasks to facilitate return toward PLOF and improved quality of life  From an occupational therapy standpoint, recommendation at time of d/c would be post acute rehab  Should Pt decline rehab, please consider HHOT       OT Discharge Recommendation: Post acute rehabilitation services

## 2023-05-02 NOTE — H&P
"[address]  H&P  Name: Willam Howe 78 y o  female I MRN: [id number]  Unit/Bed#: -01 I Date of Admission: 5/1/2023   Date of Service: 5/2/2023 I Hospital Day: 0      Assessment/Plan   * Acute cystitis without hematuria  Assessment & Plan  · Presents from home with reports of tremors and ambulatory dysfunction  · UA found to be abnormal showing pyuria  · Does not meet sepsis criteria  · Follow-up urine culture, continue Rocephin  · Urine retention protocol    Transaminitis  Assessment & Plan  · Mild, present on admission   · No abdominal complaints   No recent medication changes  · Check hepatitis panel, ethanol, tylenol level   · Trend labs     Ambulatory dysfunction  Assessment & Plan  · Reporting recent falls (without injury or head strike per patient ) and difficulty ambulating  Lives at home, uses walkers  · UA showing acute cystitis which could be contributing  · CTH showing moderate chronic small vessel changes  · Patient also takes chronic opiates for chronic pain   · Continue to treat acute cystitis and assess for improvement  · Cx PT/OT/CM   · Fall precautions       CAD (coronary artery disease)  Assessment & Plan  · CAD s/p CABG   · Continue BB, statin, ASA , ACE-I     Chronic atrial fibrillation (HCC)  Assessment & Plan  · Continue metoprolol 12 5 mg BID for rate control   · Patient reporting she switched to coumadin due to cost  Has been taking coumadin 5mg daily  · Will check PT/INR and dose accordingly, INR goal 2-3  Tremor, essential  Assessment & Plan  · Reporting tremor has been ongoing for \"months\"  · Per chart review , follows with neurology  · CTH showing moderate chronic small vessel changes  · Slight tremor bilateral hands on exam   No focal deficit  ·  continue outpatient follow-up    Chronic pain syndrome  Assessment & Plan  · Chronic back pain , lumbar radiculopathy ,post laminectomy, spinal cord stimulator in place     · PDMP reviewed takes " Norco  mg , continue at reduced dose for now and monitor   · Continue gabapentin         VTE Pharmacologic Prophylaxis: VTE Score: 5 High Risk (Score >/= 5) - Pharmacological DVT Prophylaxis Ordered: warfarin (Coumadin)  Sequential Compression Devices Ordered  Code Status: Level 1 - Full Code   Discussion with family: Patient declined call to   Anticipated Length of Stay: Patient will be admitted on an inpatient basis with an anticipated length of stay of greater than 2 midnights secondary to Acute cystitis, ambulatory dysfunction, transaminitis-IV antibiotics, labs, PT OT  Total Time Spent on Date of Encounter in care of patient: 75 minutes This time was spent on one or more of the following: performing physical exam; counseling and coordination of care; obtaining or reviewing history; documenting in the medical record; reviewing/ordering tests, medications or procedures; communicating with other healthcare professionals and discussing with patient's family/caregivers  Chief Complaint: Tremors    History of Present Illness:  Willam Howe is a 78 y o  female with a PMH of CAD, atrial fibrillation on Coumadin, chronic back pain neuropathy who presents from home with reports of tremors in her hands  Patient reporting tremors have been ongoing for months  Has been having difficulty ambulating and has had several falls at home recently  Patient reports no loss of consciousness or head strike or pain from fall  Patient reports she ambulates with a walker at baseline, has chronic back pain  Denies fevers, chills, dysuria, chest pain, shortness of breath, abdominal pain nausea vomiting diarrhea   Review of Systems:  Review of Systems   Constitutional: Negative for chills, fatigue and fever  Respiratory: Negative for cough and shortness of breath  Cardiovascular: Negative for chest pain, palpitations and leg swelling     Gastrointestinal: Negative for abdominal pain, diarrhea, nausea and vomiting  Genitourinary: Negative for difficulty urinating and dysuria  Musculoskeletal: Positive for arthralgias, back pain and gait problem  Neurological: Positive for tremors  Negative for dizziness, speech difficulty, weakness, light-headedness, numbness and headaches  All other systems reviewed and are negative  Past Medical and Surgical History:   Past Medical History:   Diagnosis Date    Chronic [facility] fibrillation ([facility] )     Myocardial infarction ([facility] )     Stroke ([facility] ) 2022    left sided deficit       Past Surgical History:   Procedure Laterality Date    BACK SURGERY      with hardware    CORONARY ARTERY BYPASS GRAFT      stents x 2 post CABG    GASTRIC BYPASS      HYSTERECTOMY      [place] PRQ IMPLTJ NSTIM ELECTRODE ARRAY EPIDURAL Left 4/20/2021    Procedure: INSERTION THORACIC DORSAL COLUMN SPINAL CORD STIMULATOR PERCUTANEOUS W IMPLANTABLE PULSE GENERATOR, LEFT FLANK;  Surgeon: Lisa Keller MD;  Location:  MAIN OR;  Service: Neurosurgery       Meds/Allergies:  Prior to Admission medications    Medication Sig Start Date End Date Taking?  Authorizing Provider   ascorbic acid (VITAMIN C) 500 MG tablet Take 500 mg by mouth daily   Yes Historical Provider, MD   aspirin (ECOTRIN LOW STRENGTH) 81 mg EC tablet Take 81 mg by mouth daily   Yes Historical Provider, MD   atorvastatin (LIPITOR) 80 mg tablet TAKE 1 TABLET NIGHTLY 10/2/20  Yes Historical Provider, MD   BETA CAROTENE PO Take 7,500 mcg by mouth daily   Yes Historical Provider, MD   calcium citrate-vitamin D (CITRACAL+D) 315-200 MG-UNIT per tablet 1 tablet 2x daily   Yes Historical Provider, MD   co-enzyme Q-10 30 MG capsule Take 100 mg by mouth daily   Yes Historical Provider, MD   docusate sodium (COLACE) 100 mg capsule Take 100 mg by mouth 2 (two) times a day   Yes Historical Provider, MD   ezetimibe (ZETIA) 10 mg tablet TAKE 1 TABLET DAILY 10/9/20  Yes Historical Provider, MD   gabapentin (NEURONTIN) 800 mg tablet Take 800 mg by mouth 3 (three) times a day 1/20/21  Yes Historical Provider, MD   IRON, FERROUS SULFATE, PO Take 65 mg by mouth daily   Yes Historical Provider, MD   lisinopril (ZESTRIL) 2 5 mg tablet Take 2 5 mg by mouth daily 5/26/21 5/1/23 Yes Historical Provider, MD   metoprolol tartrate (LOPRESSOR) 25 mg tablet Take 12 5 mg by mouth every 12 (twelve) hours 10/9/20  Yes Historical Provider, MD   Multiple Vitamins-Iron (Multi-Vitamin/Iron) TABS Take by mouth   Yes Historical Provider, MD   Omega-3 Fatty Acids (FISH OIL PO) Take by mouth daily   Yes Historical Provider, MD   PARoxetine (PAXIL) 30 mg tablet Take 30 mg by mouth daily 2/17/21  Yes Historical Provider, MD   primidone (MYSOLINE) 250 mg tablet Take 250 mg by mouth 2 (two) times a day 4/11/23 4/10/24 Yes Historical Provider, MD   vitamin B-12 (VITAMIN B-12) 1,000 mcg tablet Take 1,000 mcg by mouth daily   Yes Historical Provider, MD   warfarin (COUMADIN) 5 mg tablet Take 5 mg by mouth daily   Yes Historical Provider, MD   acetaminophen (TYLENOL) 500 mg tablet Take 500 mg by mouth every 4 (four) hours as needed    Historical Provider, MD   metoprolol succinate (TOPROL-XL) 25 mg 24 hr tablet Take 12 5 mg by mouth daily  Patient not taking: Reported on 5/2/2023 5/26/21 11/15/22  Historical Provider, MD   naloxone (NARCAN) 4 mg/0 1 mL nasal spray Administer 1 spray into a nostril  If no response after 2-3 minutes, give another dose in the other nostril using a new spray    Patient not taking: Reported on 5/4/2021 4/20/21   EMIGDIO Conner   nitroglycerin (NITROSTAT) 0 4 mg SL tablet     Historical Provider, MD   oxyCODONE-acetaminophen (PERCOCET)  mg per tablet Take 1 tablet by mouth every 6 (six) hours as needed for moderate painMax Daily Amount: 4 tablets 4/20/21   EMIGDIO Conner   senna (SENOKOT) 8 6 MG tablet Take 1 tablet (8 6 mg total) by mouth 2 (two) times a day  Patient not taking: Reported on 5/4/2021 4/20/21 5/2/23  EMIGDIO Conner   thiamine 250 "MG tablet Take 250 mg by mouth daily  Patient not taking: Reported on 5/1/2023 5/2/23  Historical Provider, MD   zolpidem (AMBIEN) 10 mg tablet Take 10 mg by mouth daily at bedtime as needed  Patient not taking: Reported on 5/2/2023 5/2/23  Historical Provider, MD     I have reviewed home medications with patient personally  Allergies: No Known Allergies    Social History:  Marital Status: /Civil Union     Patient Pre-hospital Living Situation: Home  Patient Pre-hospital Level of Mobility: walks with walker    Substance Use History:   Social History     Substance and Sexual Activity   Alcohol Use Not Currently     Social History     Tobacco Use   Smoking Status Never   Smokeless Tobacco Never     Social History     Substance and Sexual Activity   Drug Use Never       Family History:  History reviewed  No pertinent family history  Physical Exam:     Vitals:   Blood Pressure: 151/75 (05/02/23 0218)  Pulse: 75 (05/02/23 0218)  Temperature: 97 7 °F (36 5 °C) (05/02/23 0218)  Respirations: 16 (05/02/23 0218)  Height: 5' 1\" (154 9 cm) (05/01/23 2220)  Weight - Scale: 64 kg (141 lb 1 5 oz) (05/01/23 2220)  SpO2: 93 % (05/02/23 0218)    Physical Exam  Vitals and nursing note reviewed  Constitutional:       General: She is not in acute distress  Appearance: Normal appearance  She is not ill-appearing or toxic-appearing  HENT:      Head: Normocephalic and atraumatic  Eyes:      Pupils: Pupils are equal, round, and reactive to light  Cardiovascular:      Rate and Rhythm: Normal rate  Pulses: Normal pulses  Heart sounds: Normal heart sounds  Pulmonary:      Effort: Pulmonary effort is normal  No respiratory distress  Breath sounds: No wheezing, rhonchi or rales  Abdominal:      General: Bowel sounds are normal  There is no distension  Palpations: Abdomen is soft  Tenderness: There is no abdominal tenderness  There is no guarding or rebound     Musculoskeletal:         " General: No swelling  Cervical back: Normal range of motion  Right lower leg: No edema  Left lower leg: Edema present  Comments: Trace edema LLE   Skin:     General: Skin is warm and dry  Neurological:      General: No focal deficit present  Mental Status: She is alert and oriented to person, place, and time  Comments: AAO x 4  No focal deficit  No speech difficulties  Slight tremor bilateral hands with arms raised  Do not appreciate resting tremor  Additional Data:     Lab Results:  Results from last 7 days   Lab Units 05/01/23  2254   WBC Thousand/uL 8 70   HEMOGLOBIN g/dL 13 9   HEMATOCRIT % 43 0   PLATELETS Thousands/uL 213   NEUTROS PCT % 69   LYMPHS PCT % 15   MONOS PCT % 12   EOS PCT % 3     Results from last 7 days   Lab Units 05/01/23  2254   SODIUM mmol/L 135   POTASSIUM mmol/L 5 1   CHLORIDE mmol/L 99   CO2 mmol/L 31   BUN mg/dL 12   CREATININE mg/dL 0 62   ANION GAP mmol/L 5   CALCIUM mg/dL 9 9   ALBUMIN g/dL 4 2   TOTAL BILIRUBIN mg/dL 0 56   ALK PHOS U/L 133*   ALT U/L 89*   AST U/L 55*   GLUCOSE RANDOM mg/dL 95         Results from last 7 days   Lab Units 05/01/23  2222   POC GLUCOSE mg/dl 102               Lines/Drains:  Invasive Devices     Peripheral Intravenous Line  Duration           Peripheral IV 05/01/23 Right Antecubital <1 day                    Imaging: Reviewed radiology reports from this admission including: CT head  CT head without contrast   Final Result by Chery Louis MD (05/02 1879)      No acute intracranial abnormality  Moderate chronic small vessel ischemic changes  If symptoms persist, an MRI brain could be performed for further evaluation  EKG and Other Studies Reviewed on Admission:   · EKG: No EKG obtained  ** Please Note: This note has been constructed using a voice recognition system   **

## 2023-05-02 NOTE — ASSESSMENT & PLAN NOTE
· Chronic back pain , lumbar radiculopathy ,post laminectomy, spinal cord stimulator in place     · PDMP reviewed takes Norco  mg , continue at reduced dose for now and monitor   · Continue gabapentin

## 2023-05-03 LAB
25(OH)D3 SERPL-MCNC: 26.7 NG/ML (ref 30–100)
ALBUMIN SERPL BCP-MCNC: 3.6 G/DL (ref 3.5–5)
ALP SERPL-CCNC: 104 U/L (ref 34–104)
ALT SERPL W P-5'-P-CCNC: 66 U/L (ref 7–52)
ANION GAP SERPL CALCULATED.3IONS-SCNC: 6 MMOL/L (ref 4–13)
AST SERPL W P-5'-P-CCNC: 44 U/L (ref 13–39)
BILIRUB SERPL-MCNC: 0.38 MG/DL (ref 0.2–1)
BUN SERPL-MCNC: 11 MG/DL (ref 5–25)
CALCIUM SERPL-MCNC: 8.9 MG/DL (ref 8.4–10.2)
CHLORIDE SERPL-SCNC: 100 MMOL/L (ref 96–108)
CO2 SERPL-SCNC: 27 MMOL/L (ref 21–32)
CREAT SERPL-MCNC: 0.59 MG/DL (ref 0.6–1.3)
GFR SERPL CREATININE-BSD FRML MDRD: 89 ML/MIN/1.73SQ M
GLUCOSE SERPL-MCNC: 89 MG/DL (ref 65–140)
INR PPP: 1.27 (ref 0.84–1.19)
POTASSIUM SERPL-SCNC: 4.3 MMOL/L (ref 3.5–5.3)
PROT SERPL-MCNC: 6.3 G/DL (ref 6.4–8.4)
PROTHROMBIN TIME: 16 SECONDS (ref 11.6–14.5)
SODIUM SERPL-SCNC: 133 MMOL/L (ref 135–147)

## 2023-05-03 RX ORDER — ERGOCALCIFEROL 1.25 MG/1
50000 CAPSULE ORAL WEEKLY
Status: DISCONTINUED | OUTPATIENT
Start: 2023-05-03 | End: 2023-05-04 | Stop reason: HOSPADM

## 2023-05-03 RX ORDER — WARFARIN SODIUM 2.5 MG/1
2.5 TABLET ORAL
Status: COMPLETED | OUTPATIENT
Start: 2023-05-03 | End: 2023-05-03

## 2023-05-03 RX ADMIN — GABAPENTIN 800 MG: 400 CAPSULE ORAL at 21:55

## 2023-05-03 RX ADMIN — GABAPENTIN 800 MG: 400 CAPSULE ORAL at 10:13

## 2023-05-03 RX ADMIN — ATORVASTATIN CALCIUM 80 MG: 40 TABLET, FILM COATED ORAL at 21:54

## 2023-05-03 RX ADMIN — DOCUSATE SODIUM 100 MG: 100 CAPSULE ORAL at 10:13

## 2023-05-03 RX ADMIN — DOCUSATE SODIUM 100 MG: 100 CAPSULE ORAL at 18:27

## 2023-05-03 RX ADMIN — EZETIMIBE 10 MG: 10 TABLET ORAL at 10:13

## 2023-05-03 RX ADMIN — Medication 12.5 MG: at 21:58

## 2023-05-03 RX ADMIN — ERGOCALCIFEROL 50000 UNITS: 1.25 CAPSULE ORAL at 18:27

## 2023-05-03 RX ADMIN — WARFARIN SODIUM 2.5 MG: 2.5 TABLET ORAL at 18:29

## 2023-05-03 RX ADMIN — OXYCODONE HYDROCHLORIDE AND ACETAMINOPHEN 500 MG: 500 TABLET ORAL at 10:13

## 2023-05-03 RX ADMIN — CYANOCOBALAMIN TAB 500 MCG 1000 MCG: 500 TAB at 10:13

## 2023-05-03 RX ADMIN — PAROXETINE 30 MG: 20 TABLET, FILM COATED ORAL at 10:13

## 2023-05-03 RX ADMIN — GABAPENTIN 800 MG: 400 CAPSULE ORAL at 18:27

## 2023-05-03 RX ADMIN — ASPIRIN 81 MG: 81 TABLET, COATED ORAL at 10:13

## 2023-05-03 RX ADMIN — OXYCODONE HYDROCHLORIDE AND ACETAMINOPHEN 1 TABLET: 5; 325 TABLET ORAL at 21:54

## 2023-05-03 RX ADMIN — WARFARIN SODIUM 5 MG: 5 TABLET ORAL at 18:28

## 2023-05-03 NOTE — NURSING NOTE
Patient daughter Jose Juan Jasmine called requesting to speak with Dr Osvaldo Cespedes  She is requesting Dr Osvaldo Cespedes call patients other daughter Flora Youngblood back at [phone] regarding they are wanting patient to be transferred to [facility] rather than discharge to [facility] tomorrow  They are wanting patient's c/o tremors evaluated by neurology  Message sent to Dr Osvaldo Cespedes stating such via TT  New order for neurology consult noted

## 2023-05-03 NOTE — PLAN OF CARE
Problem: PAIN - ADULT  Goal: Verbalizes/displays adequate comfort level or baseline comfort level  Description: Interventions:  - Encourage patient to monitor pain and request assistance  - Assess pain using appropriate pain scale  - Administer analgesics based on type and severity of pain and evaluate response  - Implement non-pharmacological measures as appropriate and evaluate response  - Consider cultural and social influences on pain and pain management  - Notify physician/advanced practitioner if interventions unsuccessful or patient reports new pain  Outcome: Progressing     Problem: INFECTION - ADULT  Goal: Absence or prevention of progression during hospitalization  Description: INTERVENTIONS:  - Assess and monitor for signs and symptoms of infection  - Monitor lab/diagnostic results  - Monitor all insertion sites, i e  indwelling lines, tubes, and drains  - Monitor endotracheal if appropriate and nasal secretions for changes in amount and color  - [place] appropriate cooling/warming therapies per order  - Administer medications as ordered  - Instruct and encourage patient and family to use good hand hygiene technique  - Identify and instruct in appropriate isolation precautions for identified infection/condition  Outcome: Progressing

## 2023-05-03 NOTE — PROGRESS NOTES
"[address]  Progress Note  Name: Krysta Hdz  MRN: [id number]  Unit/Bed#: -01 I Date of Admission: 5/1/2023   Date of Service: 5/3/2023 I Hospital Day: 1    Assessment/Plan   * Acute cystitis without hematuria  Assessment & Plan  · Presents from home with reports of tremors and ambulatory dysfunction  · UA found to be abnormal showing pyuria  · Does not meet sepsis criteria  · Follow-up urine culture, continue Rocephin  · Urine retention protocol    Transaminitis  Assessment & Plan  · Mild, present on admission   · No abdominal complaints   No recent medication changes  · neg hepatitis panel, ethanol, tylenol level   · Trend labs     CAD (coronary artery disease)  Assessment & Plan  · CAD s/p CABG   · Continue BB, statin, ASA , ACE-I     Chronic atrial fibrillation (HCC)  Assessment & Plan  · Continue metoprolol 12 5 mg BID for rate contro  coumadin 7 5 mg once  Patient reporting she switched to coumadin due to cost  Has been taking coumadin 5mg daily at home  · Will check PT/INR and dose accordingly, INR goal 2-3 currently inr is low     Tremor, essential  Assessment & Plan  · Reporting tremor has been ongoing for \"months\"  · Per chart review , follows with neurology  · CTH showing moderate chronic small vessel changes  · Slight tremor bilateral hands on exam   No focal deficit  ·  continue outpatient follow-up    Ambulatory dysfunction  Assessment & Plan  · Reporting recent falls (without injury or head strike per patient ) and difficulty ambulating  Lives at home, uses walkers     · UA showing acute cystitis which could be contributing  · CTH showing moderate chronic small vessel changes  · Patient also takes chronic opiates for chronic pain   · Continue to treat acute cystitis and assess for improvement  · Cx PT/OT/CM   · Fall precautions       Chronic pain syndrome  Assessment & Plan  · Chronic back pain , lumbar radiculopathy ,post laminectomy, spinal cord " stimulator in place  · PDMP reviewed takes Norco  mg , continue at reduced dose for now and monitor   · Continue gabapentin          VTE Pharmacologic Prophylaxis:   Pharmacologic: Warfarin (Coumadin)  Mechanical VTE Prophylaxis in Place: Yes    Patient Centered Rounds: I have performed bedside rounds with nursing staff today  Discussions with Specialists or Other Care Team Provider: none    Education and Discussions with Family / Patient: dw patient at bedside and will update family    Time Spent for Care: 30 minutes  More than 50% of total time spent on counseling and coordination of care as described above  Current Length of Stay: 1 day(s)    Current Patient Status: Inpatient   Certification Statement: The patient will continue to require additional inpatient hospital stay due to acute cystitis     Discharge Plan: dc to snf tomorrow    Code Status: Level 1 - Full Code      Subjective:   Patient denies any chest pain or shortness of breath or abdominal pain  Agreeable to going to rehab tomorrow to [place]  denies any dysuria    Objective:     Vitals:   Temp (24hrs), Av 9 °F (36 6 °C), Min:97 9 °F (36 6 °C), Max:97 9 °F (36 6 °C)    Temp:  [97 9 °F (36 6 °C)] 97 9 °F (36 6 °C)  HR:  [63-76] 63  Resp:  [16-17] 16  BP: (101-130)/(54-64) 107/61  SpO2:  [95 %-99 %] 95 %  Body mass index is 26 66 kg/m²  Input and Output Summary (last 24 hours): Intake/Output Summary (Last 24 hours) at 5/3/2023 1501  Last data filed at 5/3/2023 0900  Gross per 24 hour   Intake 360 ml   Output 300 ml   Net 60 ml       Physical Exam:     Physical Exam  Vitals and nursing note reviewed  Constitutional:       Appearance: Normal appearance  HENT:      Head: Normocephalic and atraumatic  Right Ear: External ear normal       Left Ear: External ear normal       Nose: Nose normal       Mouth/Throat:      Pharynx: Oropharynx is clear  Eyes:      Pupils: Pupils are equal, round, and reactive to light  Cardiovascular:      Rate and Rhythm: Normal rate and regular rhythm  Heart sounds: Normal heart sounds  Pulmonary:      Effort: Pulmonary effort is normal       Breath sounds: Normal breath sounds  Abdominal:      General: Bowel sounds are normal       Palpations: Abdomen is soft  Tenderness: There is no abdominal tenderness  Musculoskeletal:         General: Normal range of motion  Cervical back: Normal range of motion and neck supple  Comments: Mild intention tremor noted b/l upper extremity   Skin:     General: Skin is warm and dry  Capillary Refill: Capillary refill takes less than 2 seconds  Neurological:      General: No focal deficit present  Mental Status: She is alert and oriented to person, place, and time  Psychiatric:         Mood and Affect: Mood normal            Additional Data:     Labs:    Results from last 7 days   Lab Units 05/02/23  0541   WBC Thousand/uL 8 27   HEMOGLOBIN g/dL 12 9   HEMATOCRIT % 41 2   PLATELETS Thousands/uL 196   NEUTROS PCT % 74   LYMPHS PCT % 12*   MONOS PCT % 9   EOS PCT % 4     Results from last 7 days   Lab Units 05/03/23  0524   SODIUM mmol/L 133*   POTASSIUM mmol/L 4 3   CHLORIDE mmol/L 100   CO2 mmol/L 27   BUN mg/dL 11   CREATININE mg/dL 0 59*   ANION GAP mmol/L 6   CALCIUM mg/dL 8 9   ALBUMIN g/dL 3 6   TOTAL BILIRUBIN mg/dL 0 38   ALK PHOS U/L 104   ALT U/L 66*   AST U/L 44*   GLUCOSE RANDOM mg/dL 89     Results from last 7 days   Lab Units 05/03/23  0524   INR  1 27*     Results from last 7 days   Lab Units 05/01/23  2222   POC GLUCOSE mg/dl 102         Results from last 7 days   Lab Units 05/01/23  2313   PROCALCITONIN ng/ml <0 05           * I Have Reviewed All Lab Data Listed Above  * Additional Pertinent Lab Tests Reviewed:  Aj 66 Admission Reviewed    Imaging:    Imaging Reports Reviewed Today Include: ct head  Imaging Personally Reviewed by Myself Includes:  Ct head    Recent Cultures (last 7 days):     Results from last 7 days   Lab Units 05/02/23  0440 05/01/23  2312   URINE CULTURE   --  >100,000 cfu/ml Escherichia coli*   C DIFF TOXIN B BY PCR  Negative  --        Last 24 Hours Medication List:   Current Facility-Administered Medications   Medication Dose Route Frequency Provider Last Rate    acetaminophen  650 mg Oral Q6H PRN Ycallie Mratinezenthal Yumiko, SAHILNP      ascorbic acid  500 mg Oral Daily Yvetta Leventhal Dimler, CRNP      aspirin  81 mg Oral Daily Yvetta Leventhal Dimler, CRNP      atorvastatin  80 mg Oral HS Yvetta Leventhal Dimler, CRNP      cefTRIAXone  1,000 mg Intravenous Q24H Yvetta Leventhal Dimler, CRNP 1,000 mg (05/02/23 8528)    vitamin B-12  1,000 mcg Oral Daily Yvetta Leventhal Dimler, CRNP      docusate sodium  100 mg Oral BID Yvetta Leventhal Dimler, CRNP      ergocalciferol  50,000 Units Oral Weekly Viola Bowers MD      ezetimibe  10 mg Oral Daily Yvetta Leventhal Dimler, CRNP      gabapentin  800 mg Oral TID Yvetta Leventhal Dimler, EMIGDIO      lisinopril  2 5 mg Oral Daily Yvetta Leventhal Dimler, CRNP      metoprolol tartrate  12 5 mg Oral Q12H [address] Yvetta Leventhal Dimler, EMIGDIO      ondansetron  4 mg Intravenous Q6H PRN Viola Bowers MD      oxyCODONE-acetaminophen  1 tablet Oral Q6H PRN Yvetta Leventhal Dimler, CRNP      PARoxetine  30 mg Oral Daily Yvetta Leventhal Dimler, CRNP      warfarin  2 5 mg Oral Once (warfarin) Viola Bowers MD      warfarin  5 mg Oral Daily (warfarin) Viola Bowers MD          Today, Patient Was Seen By: Viola Bowers MD    ** Please Note: Dictation voice to text software may have been used in the creation of this document   **

## 2023-05-03 NOTE — ASSESSMENT & PLAN NOTE
· Mild, present on admission   · No abdominal complaints   No recent medication changes    · neg hepatitis panel, ethanol, tylenol level   · Trend labs

## 2023-05-03 NOTE — PLAN OF CARE
Problem: Potential for Falls  Goal: Patient will remain free of falls  Description: INTERVENTIONS:  - Educate patient/family on patient safety including physical limitations  - Instruct patient to call for assistance with activity   - Consult OT/PT to assist with strengthening/mobility   - Keep Call bell within reach  - Keep bed low and locked with side rails adjusted as appropriate  - Keep care items and personal belongings within reach  - Initiate and maintain comfort rounds  - Make Fall Risk Sign visible to staff  -  - Apply yellow socks and bracelet for high fall risk patients  - Consider moving patient to room near nurses station  Outcome: Progressing     Problem: MOBILITY - ADULT  Goal: Maintain or return to baseline ADL function  Description: INTERVENTIONS:  -  Assess patient's ability to carry out ADLs; assess patient's baseline for ADL function and identify physical deficits which impact ability to perform ADLs (bathing, care of mouth/teeth, toileting, grooming, dressing, etc )  - Assess/evaluate cause of self-care deficits   - Assess range of motion  - Assess patient's mobility; develop plan if impaired  - Assess patient's need for assistive devices and provide as appropriate  - Encourage maximum independence but intervene and supervise when necessary  - Involve family in performance of ADLs  - Assess for home care needs following discharge   - Consider OT consult to assist with ADL evaluation and planning for discharge  - Provide patient education as appropriate  Outcome: Progressing     Problem: PAIN - ADULT  Goal: Verbalizes/displays adequate comfort level or baseline comfort level  Description: Interventions:  - Encourage patient to monitor pain and request assistance  - Assess pain using appropriate pain scale  - Administer analgesics based on type and severity of pain and evaluate response  - Implement non-pharmacological measures as appropriate and evaluate response  - Consider cultural and social influences on pain and pain management  - Notify physician/advanced practitioner if interventions unsuccessful or patient reports new pain  Outcome: Progressing     Problem: INFECTION - ADULT  Goal: Absence or prevention of progression during hospitalization  Description: INTERVENTIONS:  - Assess and monitor for signs and symptoms of infection  - Monitor lab/diagnostic results  - Monitor all insertion sites, i e  indwelling lines, tubes, and drains  - Monitor endotracheal if appropriate and nasal secretions for changes in amount and color  - [place] appropriate cooling/warming therapies per order  - Administer medications as ordered  - Instruct and encourage patient and family to use good hand hygiene technique  - Identify and instruct in appropriate isolation precautions for identified infection/condition  Outcome: Progressing

## 2023-05-03 NOTE — ASSESSMENT & PLAN NOTE
· Continue metoprolol 12 5 mg BID for rate contro  coumadin 7 5 mg once  Patient reporting she switched to coumadin due to cost  Has been taking coumadin 5mg daily at home    · Will check PT/INR and dose accordingly, INR goal 2-3 currently inr is low

## 2023-05-03 NOTE — PLAN OF CARE
Problem: PHYSICAL THERAPY ADULT  Goal: Performs mobility at highest level of function for planned discharge setting  See evaluation for individualized goals  Description: Treatment/Interventions: ADL retraining, Functional transfer training, LE strengthening/ROM, Elevations, Therapeutic exercise, Endurance training, Patient/family training, Equipment eval/education, Bed mobility, Gait training, Compensatory technique education, Spoke to nursing, Spoke to case management, OT  Equipment Recommended:  (TBD by rehab)       See flowsheet documentation for full assessment, interventions and recommendations  Outcome: Progressing  Note: Prognosis: Good  Problem List: Decreased strength, Decreased endurance, Impaired balance, Decreased mobility, Decreased safety awareness  Assessment: Pt seen for PT treatment session this date with interventions consisting of bed mobility tasks, transfer training,gait training, toilet transfers, and education provided as needed for safety and direction to improve functional mobility, safety awareness, and activity tolerance  Pt agreeable to PT treatment session upon arrival, pt found resting in bed  At end of session, pt left in bed positioned for comfort with bed alarm activated and with all needs in reach  In comparison to previous session, pt with improvements in ambulation distance and amount of assistance required for transfers   Continue to recommend STR at time of d/c in order to maximize pt's functional independence and safety w/ mobility  Pt continues to be functioning below baseline level  PT will continue to see pt while here in order to address the deficits listed above and provide interventions consistent w/ POC in effort to achieve STGs    Barriers to Discharge: Decreased caregiver support, Inaccessible home environment  Barriers to Discharge Comments: requires assistance to complete mobility, decreased activity tolerance  PT Discharge Recommendation: Post acute rehabilitation services    See flowsheet documentation for full assessment

## 2023-05-03 NOTE — PHYSICAL THERAPY NOTE
"   PHYSICAL THERAPY TREATMENT NOTE  NAME:  Guera Black  DATE: 05/03/23    Length Of Stay: 1  Performed at least 2 patient identifiers during session: Name and ID bracelet    TREATMENT FLOWSHEET:    05/03/23 1508   PT Last Visit   PT Visit Date 05/03/23   Note Type   Note Type Treatment   Pain Assessment   Pain Assessment Tool 0-10   Pain Score No Pain   Restrictions/Precautions   Weight Bearing Precautions Per Order No   Other Precautions Chair Alarm; Bed Alarm; Fall Risk   General   Chart Reviewed Yes   Response to Previous Treatment Patient with no complaints from previous session  Family/Caregiver Present No   Cognition   Overall Cognitive Status WFL   Arousal/Participation Alert; Cooperative   Attention Within functional limits   Orientation Level Oriented X4   Memory Within functional limits   Following Commands Follows one step commands without difficulty   Subjective   Subjective \"I get tremors on occation but they don't know what they arre because noone has witnessed them happening  \"   Bed Mobility   Rolling R 6  Modified independent   Additional items Increased time required   Rolling L 6  Modified independent   Additional items Increased time required   Supine to Sit 5  Supervision   Additional items Assist x 1; Increased time required;LE management   Sit to Supine 5  Supervision   Additional items Assist x 1; Increased time required;Verbal cues   Transfers   Sit to Stand   (steadying assist)   Additional items Assist x 1; Increased time required;Verbal cues  (RW)   Stand to Sit   (steadying assist)   Additional items Assist x 1; Increased time required;Verbal cues  (RW)   Stand pivot   (steadying assist)   Additional items Assist x 1; Increased time required;Verbal cues  (RW)   Toilet transfer   (steadying assist)   Additional items Assist x 1; Increased time required;Verbal cues;Standard toilet  (RW ab=nd grab bar used)   Additional Comments VC's provided for proper hand placement during transitions " Ambulation/Elevation   Gait pattern Improper Weight shift;Decreased foot clearance;Narrow CATRACHO; Short stride; Excessively slow;Decreased heel strike;Decreased toe off;Step through pattern   Gait Assistance   (steadying assist)   Additional items Assist x 1;Verbal cues   Assistive Device Rolling walker   Distance 15 ft x2 and 65 ft   Ambulation/Elevation Additional Comments No tremors noted during ambulation   Balance   Static Sitting Good   Dynamic Sitting Fair +   Static Standing Fair   Dynamic Standing Fair -   Ambulatory Fair -   Endurance Deficit   Endurance Deficit Yes   Activity Tolerance   Activity Tolerance Patient limited by fatigue   Nurse Made Aware yes   Assessment   Prognosis Good   Problem List Decreased strength;Decreased endurance; Impaired balance;Decreased mobility; Decreased safety awareness   Goals   Patient Goals to get better   PT Treatment Day 2   Plan   Treatment/Interventions Functional transfer training;LE strengthening/ROM; Elevations; Therapeutic exercise; Endurance training;Patient/family training;Equipment eval/education; Bed mobility;Gait training; Compensatory technique education;Spoke to nursing   Progress Progressing toward goals   PT Frequency 3-5x/wk   Recommendation   PT Discharge Recommendation Post acute rehabilitation services   AM-PAC Basic Mobility Inpatient   Turning in Flat Bed Without Bedrails 3   Lying on Back to Sitting on Edge of Flat Bed Without Bedrails 3   Moving Bed to Chair 3   Standing Up From Chair Using Arms 3   Walk in Room 3   Climb 3-5 Stairs With Railing 1   Basic Mobility Inpatient Raw Score 16   Basic Mobility Standardized Score 38 32   Highest Level Of Mobility   -HLM Goal 5: Stand one or more mins   JH-HLM Achieved 7: Walk 25 feet or more       The patient's AM-[facility] Basic Mobility Inpatient Short Form Raw Score is 16  A raw score 16 suggests the patient may benefit from discharge to post-acute rehabilitation services   Please also refer to the recommendation of the Physical Therapist for safe discharge planning  Pt seen for PT treatment session this date with interventions consisting of bed mobility tasks, transfer training,gait training, toilet transfers, and education provided as needed for safety and direction to improve functional mobility, safety awareness, and activity tolerance  Pt agreeable to PT treatment session upon arrival, pt found resting in bed  At end of session, pt left in bed positioned for comfort with bed alarm activated and with all needs in reach  In comparison to previous session, pt with improvements in ambulation distance and amount of assistance required for transfers   Continue to recommend STR at time of d/c in order to maximize pt's functional independence and safety w/ mobility  Pt continues to be functioning below baseline level  PT will continue to see pt while here in order to address the deficits listed above and provide interventions consistent w/ POC in effort to achieve STGs      Shantel Spangler [place], [place]

## 2023-05-04 VITALS
RESPIRATION RATE: 17 BRPM | WEIGHT: 141.09 LBS | BODY MASS INDEX: 26.64 KG/M2 | DIASTOLIC BLOOD PRESSURE: 63 MMHG | SYSTOLIC BLOOD PRESSURE: 124 MMHG | TEMPERATURE: 97.7 F | OXYGEN SATURATION: 94 % | HEIGHT: 61 IN | HEART RATE: 57 BPM

## 2023-05-04 PROBLEM — G25.3 MYOCLONUS: Status: ACTIVE | Noted: 2023-05-04

## 2023-05-04 LAB
BACTERIA UR CULT: ABNORMAL
INR PPP: 1.19 (ref 0.84–1.19)
PROTHROMBIN TIME: 15.2 SECONDS (ref 11.6–14.5)
SARS-COV-2 RNA RESP QL NAA+PROBE: NEGATIVE

## 2023-05-04 RX ORDER — CEPHALEXIN 500 MG/1
500 CAPSULE ORAL EVERY 8 HOURS SCHEDULED
Status: DISCONTINUED | OUTPATIENT
Start: 2023-05-04 | End: 2023-05-04 | Stop reason: HOSPADM

## 2023-05-04 RX ORDER — CEPHALEXIN 500 MG/1
500 CAPSULE ORAL EVERY 8 HOURS SCHEDULED
Qty: 15 CAPSULE | Refills: 0
Start: 2023-05-04 | End: 2023-05-09

## 2023-05-04 RX ORDER — PAROXETINE HYDROCHLORIDE 20 MG/1
20 TABLET, FILM COATED ORAL DAILY
Start: 2023-05-04

## 2023-05-04 RX ORDER — ACETAMINOPHEN 325 MG/1
650 TABLET ORAL EVERY 6 HOURS PRN
Refills: 0
Start: 2023-05-04

## 2023-05-04 RX ORDER — WARFARIN SODIUM 5 MG/1
7.5 TABLET ORAL
Refills: 0
Start: 2023-05-04

## 2023-05-04 RX ORDER — OXYCODONE HYDROCHLORIDE AND ACETAMINOPHEN 5; 325 MG/1; MG/1
1 TABLET ORAL EVERY 6 HOURS PRN
Qty: 10 TABLET | Refills: 0 | Status: SHIPPED | OUTPATIENT
Start: 2023-05-04 | End: 2023-05-14

## 2023-05-04 RX ORDER — ERGOCALCIFEROL 1.25 MG/1
50000 CAPSULE ORAL WEEKLY
Refills: 0
Start: 2023-05-10 | End: 2023-06-07

## 2023-05-04 RX ADMIN — PAROXETINE 30 MG: 20 TABLET, FILM COATED ORAL at 09:17

## 2023-05-04 RX ADMIN — CEPHALEXIN 500 MG: 500 CAPSULE ORAL at 13:40

## 2023-05-04 RX ADMIN — ASPIRIN 81 MG: 81 TABLET, COATED ORAL at 09:18

## 2023-05-04 RX ADMIN — CEFTRIAXONE 1000 MG: 1 INJECTION, SOLUTION INTRAVENOUS at 00:14

## 2023-05-04 RX ADMIN — LISINOPRIL 2.5 MG: 2.5 TABLET ORAL at 09:17

## 2023-05-04 RX ADMIN — Medication 12.5 MG: at 09:17

## 2023-05-04 RX ADMIN — ACETAMINOPHEN 650 MG: 325 TABLET ORAL at 00:23

## 2023-05-04 RX ADMIN — CYANOCOBALAMIN TAB 500 MCG 1000 MCG: 500 TAB at 09:17

## 2023-05-04 RX ADMIN — OXYCODONE HYDROCHLORIDE AND ACETAMINOPHEN 500 MG: 500 TABLET ORAL at 09:18

## 2023-05-04 RX ADMIN — GABAPENTIN 800 MG: 400 CAPSULE ORAL at 09:16

## 2023-05-04 RX ADMIN — EZETIMIBE 10 MG: 10 TABLET ORAL at 09:17

## 2023-05-04 NOTE — ASSESSMENT & PLAN NOTE
Cedric Migdalia describes episodes that sound like myoclonic jerking  Typically jerking/tremulous movements following stroke occur only unilaterally so that would not be consistent in this particular case  She was worried about the possibility of seizure however considering that episodes affect her bilateral upper extremities, bilateral lower extremities, or both and that she does not consistently have any loss of consciousness/loss of awareness this would be very unlikely  She does not have significant signs or symptoms of parkinsonism  Myoclonus in this setting could be brought on by hepatic dysfunction, urinary dysfunction, or medications such as her paroxetine  Notably she is on some medications at home which are currently being held likely as result of her cystitis/transaminitis   -For now recommend ongoing clinical monitoring  -If possible, it would be very beneficial to try and capture an episode of myoclonus on video  She should at the very least begin to document when events happen and how long they last so that we can be more specific about treatment options  -If she continues to experience episodes of myoclonus as her metabolic functions improved/stabilized consideration could be given to a trial of decreasing her dose of paroxetine  -Would not initiate specific treatment with benzodiazepines at this time    No indication for repeat central nervous system imaging  -She would clearly benefit from ongoing PT/OT

## 2023-05-04 NOTE — ASSESSMENT & PLAN NOTE
She has a mild essential appearing tremor which actually looks more postural in nature  If she finds that this is quite bothersome or if it is interfering in any way with her rehabilitation consideration could be given to treatment with propranolol (this may assist with both rate control and control of tremor as a replacement for metoprolol provided there is not concern for any significant depression)

## 2023-05-04 NOTE — ASSESSMENT & PLAN NOTE
· Mild, present on admission   · No abdominal complaints   No recent medication changes    · neg hepatitis panel, ethanol, tylenol level   · Resolving

## 2023-05-04 NOTE — DISCHARGE SUMMARY
"[address]  Discharge- Tania Gavin 1945, 78 y o  female MRN: [id number]  Unit/Bed#: -01 Encounter: [id number]  Primary Care Provider: Tania Paez MD   Date and time admitted to hospital: 5/1/2023 10:14 PM    * Acute cystitis without hematuria  Assessment & Plan  · Presents from home with reports of tremors and ambulatory dysfunction  · UA found to be abnormal showing pyuria  · Does not meet sepsis criteria  · urine culture shows E  coli  We will transition from Rocephin to oral Keflex for 5 more days to complete total 7-day course of treatment    Transaminitis  Assessment & Plan  · Mild, present on admission   · No abdominal complaints   No recent medication changes  · neg hepatitis panel, ethanol, tylenol level   · Resolving    CAD (coronary artery disease)  Assessment & Plan  · CAD s/p CABG   · Continue BB, statin, ASA , ACE-I     Chronic atrial fibrillation (HCC)  Assessment & Plan  · Continue metoprolol 12 5 mg BID for rate control  coumadin 7 5 mg daily  Patient reporting she switched to coumadin due to cost   · Will check PT/INR and dose accordingly, INR goal 2-3 currently inr is low     Tremor, essential  Assessment & Plan  · Reporting tremor has been ongoing for \"months\"  · Per chart review , follows with neurology  · CTH showing moderate chronic small vessel changes  · Slight tremor bilateral hands on exam   No focal deficit  ·  continue outpatient follow-up with neurology and slightly decreased Paxil at this time    Ambulatory dysfunction  Assessment & Plan  · Reporting recent falls (without injury or head strike per patient ) and difficulty ambulating  Lives at home, uses walkers     · UA showing acute cystitis which could be contributing  · CTH showing moderate chronic small vessel changes  · Patient also takes chronic opiates for chronic pain   · Continue to treat acute cystitis and assess for improvement  · Cx PT/OT/CM   · Fall precautions " · recommend outpatient follow-up with neurology as scheduled for tremors      Chronic pain syndrome  Assessment & Plan  · Chronic back pain , lumbar radiculopathy ,post laminectomy, spinal cord stimulator in place  · PDMP reviewed takes Norco  mg , continue at reduced dose for now and monitor   · Continue gabapentin       Discharging Physician / Practitioner: Jose Santos MD  PCP: Rubén Mejia MD  Admission Date:   Admission Orders (From admission, onward)     Ordered        05/02/23 0129  INPATIENT ADMISSION  Once                      Discharge Date: 05/04/23    Medical Problems     Resolved Problems  Date Reviewed: 5/4/2023   None         Consultations During Hospital Stay:  · neurology    Procedures Performed:   · none    Significant Findings / Test Results:   CT head without contrast    Result Date: 5/2/2023  Impression: No acute intracranial abnormality  Moderate chronic small vessel ischemic changes  If symptoms persist, an MRI brain could be performed for further evaluation  Incidental Findings:   · none    Test Results Pending at Discharge (will require follow up):   · none     Outpatient Tests Requested:  · inr in 1 week    Complications:  none    Reason for Admission: Ambulatory dysfunction    Hospital Course:     Suellen Raygoza is a 78 y o  female patient who originally presented to the hospital on 5/1/2023 due to ambulatory dysfunction and falls and found to have acute cystitis without hematuria treated with IV Rocephin and now transition to oral Keflex to complete total 7-day course  Also has mild tremors noted bilateral upper extremity right more than the left  Discussed with neurology we will decrease Paxil slightly and have her follow-up outpatient with her neurologist for further medicine adjustment and work-up    Please see above list of diagnoses and related plan for additional information       Condition at Discharge: good     Discharge Day Visit / Exam:     Subjective: "Patient denies any chest pain or shortness of breath or abdominal pain  Feeling well  Vitals: Blood Pressure: 124/63 (05/04/23 0731)  Pulse: 57 (05/04/23 0731)  Temperature: 97 7 °F (36 5 °C) (05/04/23 0731)  Respirations: 17 (05/04/23 0731)  Height: 5' 1\" (154 9 cm) (05/01/23 2220)  Weight - Scale: 64 kg (141 lb 1 5 oz) (05/01/23 2220)  SpO2: 94 % (05/04/23 0915)  Exam:   Physical Exam  Vitals and nursing note reviewed  Constitutional:       Appearance: Normal appearance  HENT:      Head: Normocephalic and atraumatic  Right Ear: External ear normal       Left Ear: External ear normal       Nose: Nose normal       Mouth/Throat:      Pharynx: Oropharynx is clear  Cardiovascular:      Rate and Rhythm: Normal rate and regular rhythm  Heart sounds: Normal heart sounds  Pulmonary:      Effort: Pulmonary effort is normal       Breath sounds: Normal breath sounds  Abdominal:      General: Bowel sounds are normal       Palpations: Abdomen is soft  Tenderness: There is no abdominal tenderness  Musculoskeletal:         General: Normal range of motion  Cervical back: Normal range of motion and neck supple  Comments: Mild tremor noted on the right upper extremity and left upper extremity with right worse than the left with physical activity only   Skin:     General: Skin is warm and dry  Capillary Refill: Capillary refill takes less than 2 seconds  Neurological:      General: No focal deficit present  Mental Status: She is alert and oriented to person, place, and time  Psychiatric:         Mood and Affect: Mood normal          Discussion with Family: dw daughter    Discharge instructions/Information to patient and family:   See after visit summary for information provided to patient and family  Provisions for Follow-Up Care:  See after visit summary for information related to follow-up care and any pertinent home health orders        Disposition:     Other Skilled Nursing " Facility at [address] [place] [address]  [address] SNF:   · Not Applicable to this Patient - Not Applicable to this Patient    Planned Readmission: none     Discharge Statement:  I spent 35 minutes discharging the patient  This time was spent on the day of discharge  I had direct contact with the patient on the day of discharge  Greater than 50% of the total time was spent examining patient, answering all patient questions, arranging and discussing plan of care with patient as well as directly providing post-discharge instructions  Additional time then spent on discharge activities  Discharge Medications:  See after visit summary for reconciled discharge medications provided to patient and family        ** Please Note: This note has been constructed using a voice recognition system **

## 2023-05-04 NOTE — TELEMEDICINE
TeleConsultation - Neurology   [place] 78 y o  female MRN: [id number]  Unit/Bed#: -01 Encounter: [id number]        REQUIRED DOCUMENTATION:     1  This service was provided via Telemedicine  2  Provider located at [facility]  3  TeleMed provider: Nancy Willis MD   4  Identify all parties in room with patient during tele consult:  None  5  Patient was then informed that this was a Telemedicine visit and that the exam was being conducted confidentially over secure lines  My office door was closed  No one else was in the room  Patient acknowledged consent and understanding of privacy and security of the Telemedicine visit, and gave us permission to have the assistant stay in the room in order to assist with the history and to conduct the exam   I informed the patient that I have reviewed their record in Epic and presented the opportunity for them to ask any questions regarding the visit today  The patient agreed to participate  Assessment/Plan     Myoclonus  Assessment & Plan  Barney Santos describes episodes that sound like myoclonic jerking  Typically jerking/tremulous movements following stroke occur only unilaterally so that would not be consistent in this particular case  She was worried about the possibility of seizure however considering that episodes affect her bilateral upper extremities, bilateral lower extremities, or both and that she does not consistently have any loss of consciousness/loss of awareness this would be very unlikely  She does not have significant signs or symptoms of parkinsonism  Myoclonus in this setting could be brought on by hepatic dysfunction, urinary dysfunction, or medications such as her paroxetine    Notably she is on some medications at home which are currently being held likely as result of her cystitis/transaminitis   -For now recommend ongoing clinical monitoring  -If possible, it would be very beneficial to try and capture an episode of myoclonus on video  She should at the very least begin to document when events happen and how long they last so that we can be more specific about treatment options  -If she continues to experience episodes of myoclonus as her metabolic functions improved/stabilized consideration could be given to a trial of decreasing her dose of paroxetine  -Would not initiate specific treatment with benzodiazepines at this time  No indication for repeat central nervous system imaging  -She would clearly benefit from ongoing PT/OT    Chronic atrial fibrillation [facility])  Assessment & Plan  Considering her prior history of stroke and elevated [id number] score recommend ongoing anticoagulation  If she is found to frequently have a low INR or if there is concern with ability to maintain a stable INR consideration could be given to transition to a direct oral anticoagulant  Otherwise it may be reasonable to bridge her with aspirin until her INR is therapeutic at the discretion of the primary medical team     Tremor, essential  Assessment & Plan  She has a mild essential appearing tremor which actually looks more postural in nature  If she finds that this is quite bothersome or if it is interfering in any way with her rehabilitation consideration could be given to treatment with propranolol (this may assist with both rate control and control of tremor as a replacement for metoprolol provided there is not concern for any significant depression)  Ernie Richardson will need follow up in in 6 weeks with general attending or advance practitioner  She will not require outpatient neurological testing  Tori or Brian Young preferred please  History of Present Illness     Reason for Consult / Principal Problem: Tremor  Hx and PE limited by: none  HPI: Ernie Richardson is a 78 y o   female who presents with tremor  She reports that the tremor has been going on for the last few months    It began spontaneously with no particular antecedent event  She reports that it does not have any clear predilection to occurring at 1 time of day, and is not position specific in terms of laying/sitting/standing  The tremor may affect both arms, both legs, or all 4  It is typically bilateral and not unilateral   She describes jerking rhythmic movements  She does not endorse any loss of consciousness/loss of awareness associated with these events other than once when she had significant bilateral shaking which actually awoke her from sleep and was associated with some confusion  She does not note any specific events or activities or medications that precipitate the tremors  They are not painful  They are seen and not just felt, they actually do make the limbs move  Once as result of her tremors she had a buckling of her legs and actually fell  She reports that episodes may last for only a few minutes or for a while longer but she is never actually time to them  She was admitted for tremor and ambulatory dysfunction and found to have cystitis and transaminitis  She has seen [facility] neurology and was last seen on March 17  They did not describe any tremor or focal/lateralizing signs on their examination  She denies symptoms concerning for bradykinesia, REM behavior disorder, constipation, or hypophonia  She reports that her family feels that her speech is a little bit off in general     She also describes some intermittent shaking of her arms when she is holding/carrying things  I personally reviewed her noncontrast head CT which reveals no evidence of intracerebral hemorrhage or large territorial infarction  There is evidence of at least 1 chronic appearing stroke in the right basal ganglia  On review of the neurology notes, they indicate that there actually are multiple prior lacunar strokes on both sides  She does have a history of atrial fibrillation and is treated with warfarin      Inpatient consult to Neurology  Consult performed by: Brittany Castro MD  Consult ordered by: Yung Brennan MD           Review of Systems as above    Historical Information   Past Medical History:   Diagnosis Date    Chronic [facility] fibrillation ([facility] [address] )     Myocardial infarction ([facility] [address] )     Stroke ([facility] [address] ) 2022    left sided deficit     Past Surgical History:   Procedure Laterality Date    BACK SURGERY      with hardware    CORONARY ARTERY BYPASS GRAFT      stents x 2 post CABG    GASTRIC BYPASS      HYSTERECTOMY      NE PRQ IMPLTJ NSTIM ELECTRODE ARRAY EPIDURAL Left 4/20/2021    Procedure: INSERTION THORACIC DORSAL COLUMN SPINAL CORD STIMULATOR PERCUTANEOUS W IMPLANTABLE PULSE GENERATOR, LEFT FLANK;  Surgeon: Crissy Nolan MD;  Location:  MAIN OR;  Service: Neurosurgery     Social History   Social History     Substance and Sexual Activity   Alcohol Use Not Currently     Social History     Substance and Sexual Activity   Drug Use Never     E-Cigarette/Vaping    E-Cigarette Use Never User      E-Cigarette/Vaping Substances     Social History     Tobacco Use   Smoking Status Never   Smokeless Tobacco Never     Family History: History reviewed  No pertinent family history  Review of previous medical records was completed as above      Meds/Allergies   current meds:   Current Facility-Administered Medications   Medication Dose Route Frequency    acetaminophen (TYLENOL) tablet 650 mg  650 mg Oral Q6H PRN    ascorbic acid (VITAMIN C) tablet 500 mg  500 mg Oral Daily    aspirin (ECOTRIN LOW STRENGTH) EC tablet 81 mg  81 mg Oral Daily    atorvastatin (LIPITOR) tablet 80 mg  80 mg Oral HS    cefTRIAXone (ROCEPHIN) IVPB (premix in dextrose) 1,000 mg 50 mL  1,000 mg Intravenous Q24H    cyanocobalamin (VITAMIN B-12) tablet 1,000 mcg  1,000 mcg Oral Daily    docusate sodium (COLACE) capsule 100 mg  100 mg Oral BID    ergocalciferol (VITAMIN D2) capsule 50,000 Units  50,000 Units Oral Weekly    ezetimibe (ZETIA) tablet 10 mg  10 mg Oral Daily    gabapentin (NEURONTIN) capsule 800 mg  800 mg Oral TID    lisinopril (ZESTRIL) tablet 2 5 mg  2 5 mg Oral Daily    metoprolol tartrate (LOPRESSOR) partial tablet 12 5 mg  12 5 mg Oral Q12H [facility]    ondansetron (ZOFRAN) injection 4 mg  4 mg Intravenous Q6H PRN    oxyCODONE-acetaminophen (PERCOCET) 5-325 mg per tablet 1 tablet  1 tablet Oral Q6H PRN    PARoxetine (PAXIL) tablet 30 mg  30 mg Oral Daily    warfarin (COUMADIN) tablet 5 mg  5 mg Oral Daily (warfarin)    and PTA meds:   Prior to Admission Medications   Prescriptions Last Dose Informant Patient Reported? Taking?    BETA CAROTENE PO 2023  Yes Yes   Sig: Take 7,500 mcg by mouth daily   IRON, FERROUS SULFATE, PO 2023  Yes Yes   Sig: Take 65 mg by mouth daily   Multiple Vitamins-Iron (Multi-Vitamin/Iron) TABS 2023  Yes Yes   Sig: Take by mouth   Omega-3 Fatty Acids (FISH OIL PO) 2023  Yes Yes   Sig: Take by mouth daily   PARoxetine (PAXIL) 30 mg tablet 2023  Yes Yes   Sig: Take 30 mg by mouth daily   acetaminophen (TYLENOL) 500 mg tablet Unknown  Yes No   Sig: Take 500 mg by mouth every 4 (four) hours as needed   ascorbic acid (VITAMIN C) 500 MG tablet 2023  Yes Yes   Sig: Take 500 mg by mouth daily   aspirin (ECOTRIN LOW STRENGTH) 81 mg EC tablet 2023  Yes Yes   Sig: Take 81 mg by mouth daily   atorvastatin (LIPITOR) 80 mg tablet 2023  Yes Yes   Sig: TAKE 1 TABLET NIGHTLY   calcium citrate-vitamin D (CITRACAL+D) 315-200 MG-UNIT per tablet 2023  Yes Yes   Si tablet 2x daily   co-enzyme Q-10 30 MG capsule 2023  Yes Yes   Sig: Take 100 mg by mouth daily   docusate sodium (COLACE) 100 mg capsule 2023  Yes Yes   Sig: Take 100 mg by mouth 2 (two) times a day   ezetimibe (ZETIA) 10 mg tablet 2023  Yes Yes   Sig: TAKE 1 TABLET DAILY   gabapentin (NEURONTIN) 800 mg tablet 2023  Yes Yes   Sig: Take 800 mg by mouth 3 (three) times a day   lisinopril (ZESTRIL) 2 5 mg tablet 2023  Yes Yes   Sig: Take 2 5 mg by "mouth daily   metoprolol succinate (TOPROL-XL) 25 mg 24 hr tablet Not Taking  Yes No   Sig: Take 12 5 mg by mouth daily   Patient not taking: Reported on 5/2/2023   metoprolol tartrate (LOPRESSOR) 25 mg tablet 5/1/2023  Yes Yes   Sig: Take 12 5 mg by mouth every 12 (twelve) hours   naloxone (NARCAN) 4 mg/0 1 mL nasal spray   No No   Sig: Administer 1 spray into a nostril  If no response after 2-3 minutes, give another dose in the other nostril using a new spray  Patient not taking: Reported on 5/4/2021   nitroglycerin (NITROSTAT) 0 4 mg SL tablet Unknown  Yes No   Patient not taking: Reported on 11/15/2022   oxyCODONE-acetaminophen (PERCOCET)  mg per tablet   No No   Sig: Take 1 tablet by mouth every 6 (six) hours as needed for moderate painMax Daily Amount: 4 tablets   primidone (MYSOLINE) 250 mg tablet   Yes Yes   Sig: Take 250 mg by mouth 2 (two) times a day   vitamin B-12 (VITAMIN B-12) 1,000 mcg tablet 5/1/2023  Yes Yes   Sig: Take 1,000 mcg by mouth daily   warfarin (COUMADIN) 5 mg tablet 5/1/2023  Yes Yes   Sig: Take 5 mg by mouth daily      Facility-Administered Medications: None       No Known Allergies    Objective   Vitals:Blood pressure 124/63, pulse 57, temperature 97 7 °F (36 5 °C), resp  rate 17, height 5' 1\" (1 549 m), weight 64 kg (141 lb 1 5 oz), SpO2 94 %  ,Body mass index is 26 66 kg/m²  Intake/Output Summary (Last 24 hours) at 5/4/2023 1127  Last data filed at 5/4/2023 0826  Gross per 24 hour   Intake 240 ml   Output 350 ml   Net -110 ml       Invasive Devices: Invasive Devices     Peripheral Intravenous Line  Duration           Peripheral IV 05/01/23 Right Antecubital 2 days                Physical Exam  Neurologic Exam    At the time of my examination she was awake, alert, and brightly interactive  There was no dysarthria or aphasia  Her speech was neither hypophonic nor bradykinetic  Extraocular movements were intact with no nystagmus  Face was not masked    Face was symmetric " and tongue protrusion was midline  There was no drift  Finger-nose reveals intact proprioceptive function overall but diminished on the left  There is a very mild postural tremor in the right greater than left upper extremity, particularly when holding a weighted object  There was no myoclonus during my examination and no evidence of asterixis  There was no bradykinesia in the upper extremities      Lab Results:   CBC:   Results from last 7 days   Lab Units 05/02/23  0541 05/01/23  2254   WBC Thousand/uL 8 27 8 70   RBC Million/uL 4 25 4 48   HEMOGLOBIN g/dL 12 9 13 9   HEMATOCRIT % 41 2 43 0   MCV fL 97 96   PLATELETS Thousands/uL 196 213   , BMP/CMP:   Results from last 7 days   Lab Units 05/03/23  0524 05/02/23  0541 05/01/23  2254   SODIUM mmol/L 133* 136 135   POTASSIUM mmol/L 4 3 3 7 5 1   CHLORIDE mmol/L 100 104 99   CO2 mmol/L 27 27 31   BUN mg/dL 11 12 12   CREATININE mg/dL 0 59* 0 73 0 62   CALCIUM mg/dL 8 9 9 0 9 9   AST U/L 44* 51* 55*   ALT U/L 66* 79* 89*   ALK PHOS U/L 104 112* 133*   EGFR ml/min/1 73sq m 89 80 88   , Vitamin B12:   Results from last 7 days   Lab Units 05/02/23  1159   VITAMIN B 12 pg/mL 1,366*   , TSH:   Results from last 7 days   Lab Units 05/02/23  0541   TSH 3RD GENERATON uIU/mL 1 054   , Coagulation:   Results from last 7 days   Lab Units 05/04/23  0459   INR  1 19   , Lipid Profile:   , Ammonia:   , Urinalysis:   Results from last 7 days   Lab Units 05/01/23  2312   COLOR UA  Yellow   CLARITY UA  Clear   SPEC GRAV UA  1 015   PH UA  7 0   LEUKOCYTES UA  Moderate*   NITRITE UA  Positive*   GLUCOSE UA mg/dl Negative   KETONES UA mg/dl Negative   BILIRUBIN UA  Negative   BLOOD UA  Trace-Intact*     Imaging Studies: I have personally reviewed pertinent films in PACS    VTE Prophylaxis: Warfarin (Coumadin)    Code Status: Level 1 - Full Code  Advance Directive and Living Will: Yes    Power of :    POLST:      Counseling / Coordination of Care  I have spent a total time of 45 minutes on 05/04/23 in caring for this patient including Diagnostic results, Prognosis, Risks and benefits of tx options, Instructions for management, Patient and family education, Importance of tx compliance, Risk factor reductions, Impressions, Counseling / Coordination of care, Documenting in the medical record, Reviewing / ordering tests, medicine, procedures  , Obtaining or reviewing history   and Communicating with other healthcare professionals

## 2023-05-04 NOTE — CASE MANAGEMENT
Case Management Discharge Planning Note    Patient name See Young  Location /-24 MRN [id number]  : 1945 Date 2023       Current Admission Date: 2023  Current Admission Diagnosis:Acute cystitis without hematuria   Patient Active Problem List    Diagnosis Date Noted    Myoclonus 2023    Acute cystitis without hematuria 2023    Ambulatory dysfunction 2023    Tremor, essential 2023    Chronic atrial fibrillation ([facility] [address] )     CAD (coronary artery disease)     Transaminitis     Status post insertion of spinal cord stimulator 2021    Postlaminectomy syndrome 2021    Lumbar radiculopathy 2021    Chronic pain syndrome 2021      LOS (days): 2  Geometric Mean LOS (GMLOS) (days): 2 80  Days to GMLOS:0 2     OBJECTIVE:  Risk of Unplanned Readmission Score: 11 73         Current admission status: Inpatient   Preferred Pharmacy:   [address] [place] [address] [address] [place] [address],[address]  [address] [place] [id number]  Phone: [phone] Fax: [phone]    Primary Care Provider: Tay Holguin MD    Primary Insurance: MEDICARE  Secondary Insurance: BLUE CROSS    DISCHARGE DETAILS:     Pt to be transported by "[facility] at [address] Name, [address] : [facility]    [address] [facility]  "[facility] [address] [place] [address]  Receiving Facility/Agency Phone Number: Phone: [phone]  Facility/Agency Fax Number: Fax: [phone]

## 2023-05-04 NOTE — PLAN OF CARE
Problem: Potential for Falls  Goal: Patient will remain free of falls  Description: INTERVENTIONS:  - Educate patient/family on patient safety including physical limitations  - Instruct patient to call for assistance with activity   - Consult OT/PT to assist with strengthening/mobility   - Keep Call bell within reach  - Keep bed low and locked with side rails adjusted as appropriate  - Keep care items and personal belongings within reach  - Initiate and maintain comfort rounds  - Make Fall Risk Sign visible to staff  - Offer Toileting every 2 Hours, in advance of need  - Initiate/Maintain bed alarm    - Apply yellow socks and bracelet for high fall risk patients  - Consider moving patient to room near nurses station  Outcome: Progressing     Problem: MOBILITY - ADULT  Goal: Maintain or return to baseline ADL function  Description: INTERVENTIONS:  -  Assess patient's ability to carry out ADLs; assess patient's baseline for ADL function and identify physical deficits which impact ability to perform ADLs (bathing, care of mouth/teeth, toileting, grooming, dressing, etc )  - Assess/evaluate cause of self-care deficits   - Assess range of motion  - Assess patient's mobility; develop plan if impaired  - Assess patient's need for assistive devices and provide as appropriate  - Encourage maximum independence but intervene and supervise when necessary  - Involve family in performance of ADLs  - Assess for home care needs following discharge   - Consider OT consult to assist with ADL evaluation and planning for discharge  - Provide patient education as appropriate  Outcome: Progressing  Goal: Maintains/Returns to pre admission functional level  Description: INTERVENTIONS:  - Perform BMAT or MOVE assessment daily    - Set and communicate daily mobility goal to care team and patient/family/caregiver     - Collaborate with rehabilitation services on mobility goals if consulted  - Stand patient 3 times a day  - Ambulate patient 3 times a day  - Out of bed to chair 3 times a day   - Out of bed for meals 3 times a day  - Out of bed for toileting  - Record patient progress and toleration of activity level   Outcome: Progressing     Problem: PAIN - ADULT  Goal: Verbalizes/displays adequate comfort level or baseline comfort level  Description: Interventions:  - Encourage patient to monitor pain and request assistance  - Assess pain using appropriate pain scale  - Administer analgesics based on type and severity of pain and evaluate response  - Implement non-pharmacological measures as appropriate and evaluate response  - Consider cultural and social influences on pain and pain management  - Notify physician/advanced practitioner if interventions unsuccessful or patient reports new pain  Outcome: Progressing     Problem: INFECTION - ADULT  Goal: Absence or prevention of progression during hospitalization  Description: INTERVENTIONS:  - Assess and monitor for signs and symptoms of infection  - Monitor lab/diagnostic results  - Monitor all insertion sites, i e  indwelling lines, tubes, and drains  - Monitor endotracheal if appropriate and nasal secretions for changes in amount and color  - [place] appropriate cooling/warming therapies per order  - Administer medications as ordered  - Instruct and encourage patient and family to use good hand hygiene technique  - Identify and instruct in appropriate isolation precautions for identified infection/condition  Outcome: Progressing  Goal: Absence of fever/infection during neutropenic period  Description: INTERVENTIONS:  - Monitor WBC    Outcome: Progressing     Problem: SAFETY ADULT  Goal: Patient will remain free of falls  Description: INTERVENTIONS:  - Educate patient/family on patient safety including physical limitations  - Instruct patient to call for assistance with activity   - Consult OT/PT to assist with strengthening/mobility   - Keep Call bell within reach  - Keep bed low and locked with side rails adjusted as appropriate  - Keep care items and personal belongings within reach  - Initiate and maintain comfort rounds  - Make Fall Risk Sign visible to staff  - Offer Toileting every 2 Hours, in advance of need  - Initiate/Maintain bed alarm    - Apply yellow socks and bracelet for high fall risk patients  - Consider moving patient to room near nurses station  Outcome: Progressing  Goal: Maintain or return to baseline ADL function  Description: INTERVENTIONS:  -  Assess patient's ability to carry out ADLs; assess patient's baseline for ADL function and identify physical deficits which impact ability to perform ADLs (bathing, care of mouth/teeth, toileting, grooming, dressing, etc )  - Assess/evaluate cause of self-care deficits   - Assess range of motion  - Assess patient's mobility; develop plan if impaired  - Assess patient's need for assistive devices and provide as appropriate  - Encourage maximum independence but intervene and supervise when necessary  - Involve family in performance of ADLs  - Assess for home care needs following discharge   - Consider OT consult to assist with ADL evaluation and planning for discharge  - Provide patient education as appropriate  Outcome: Progressing  Goal: Maintains/Returns to pre admission functional level  Description: INTERVENTIONS:  - Perform BMAT or MOVE assessment daily    - Set and communicate daily mobility goal to care team and patient/family/caregiver     - Collaborate with rehabilitation services on mobility goals if consulted  - Stand patient 3 times a day  - Ambulate patient 3 times a day  - Out of bed to chair 3 times a day   - Out of bed for meals 3 times a day  - Out of bed for toileting  - Record patient progress and toleration of activity level   Outcome: Progressing     Problem: DISCHARGE PLANNING  Goal: Discharge to home or other facility with appropriate resources  Description: INTERVENTIONS:  - Identify barriers to discharge w/patient and caregiver  - Arrange for needed discharge resources and transportation as appropriate  - Identify discharge learning needs (meds, wound care, etc )  - Arrange for interpretive services to assist at discharge as needed  - Refer to Case Management Department for coordinating discharge planning if the patient needs post-hospital services based on physician/advanced practitioner order or complex needs related to functional status, cognitive ability, or social support system  Outcome: Progressing     Problem: Knowledge Deficit  Goal: Patient/family/caregiver demonstrates understanding of disease process, treatment plan, medications, and discharge instructions  Description: Complete learning assessment and assess knowledge base    Interventions:  - Provide teaching at level of understanding  - Provide teaching via preferred learning methods  Outcome: Progressing

## 2023-05-04 NOTE — ASSESSMENT & PLAN NOTE
· Reporting recent falls (without injury or head strike per patient ) and difficulty ambulating  Lives at home, uses walkers     · UA showing acute cystitis which could be contributing  · CTH showing moderate chronic small vessel changes  · Patient also takes chronic opiates for chronic pain   · Continue to treat acute cystitis and assess for improvement  · Cx PT/OT/CM   · Fall precautions   · recommend outpatient follow-up with neurology as scheduled for tremors

## 2023-05-04 NOTE — ASSESSMENT & PLAN NOTE
· Continue metoprolol 12 5 mg BID for rate control  coumadin 7 5 mg daily  Patient reporting she switched to coumadin due to cost   · Will check PT/INR and dose accordingly, INR goal 2-3 currently inr is low

## 2023-05-04 NOTE — ASSESSMENT & PLAN NOTE
· Presents from home with reports of tremors and ambulatory dysfunction  · UA found to be abnormal showing pyuria  · Does not meet sepsis criteria  · urine culture shows E  coli    We will transition from Rocephin to oral Keflex for 5 more days to complete total 7-day course of treatment

## 2023-05-04 NOTE — PLAN OF CARE
Problem: Potential for Falls  Goal: Patient will remain free of falls  Description: INTERVENTIONS:  - Educate patient/family on patient safety including physical limitations  - Instruct patient to call for assistance with activity   - Consult OT/PT to assist with strengthening/mobility   - Keep Call bell within reach  - Keep bed low and locked with side rails adjusted as appropriate  - Keep care items and personal belongings within reach  - Initiate and maintain comfort rounds  - Make Fall Risk Sign visible to staff  - Offer Toileting every 2 Hours, in advance of need  - Initiate/Maintain bed alarm  - Obtain necessary fall risk management equipment  - Apply yellow socks and bracelet for high fall risk patients  - Consider moving patient to room near nurses station  Outcome: Progressing     Problem: MOBILITY - ADULT  Goal: Maintain or return to baseline ADL function  Description: INTERVENTIONS:  -  Assess patient's ability to carry out ADLs; assess patient's baseline for ADL function and identify physical deficits which impact ability to perform ADLs (bathing, care of mouth/teeth, toileting, grooming, dressing, etc )  - Assess/evaluate cause of self-care deficits   - Assess range of motion  - Assess patient's mobility; develop plan if impaired  - Assess patient's need for assistive devices and provide as appropriate  - Encourage maximum independence but intervene and supervise when necessary  - Involve family in performance of ADLs  - Assess for home care needs following discharge   - Consider OT consult to assist with ADL evaluation and planning for discharge  - Provide patient education as appropriate  Outcome: Progressing  Goal: Maintains/Returns to pre admission functional level  Description: INTERVENTIONS:  - Perform BMAT or MOVE assessment daily    - Set and communicate daily mobility goal to care team and patient/family/caregiver     - Collaborate with rehabilitation services on mobility goals if consulted  - Perform Range of Motion 3 times a day  - Reposition patient every 2 hours    - Dangle patient 3 times a day  - Stand patient 3 times a day  - Ambulate patient 3 times a day  - Out of bed to chair 3 times a day   - Out of bed for meals 3 times a day  - Out of bed for toileting  - Record patient progress and toleration of activity level   Outcome: Progressing     Problem: PAIN - ADULT  Goal: Verbalizes/displays adequate comfort level or baseline comfort level  Description: Interventions:  - Encourage patient to monitor pain and request assistance  - Assess pain using appropriate pain scale  - Administer analgesics based on type and severity of pain and evaluate response  - Implement non-pharmacological measures as appropriate and evaluate response  - Consider cultural and social influences on pain and pain management  - Notify physician/advanced practitioner if interventions unsuccessful or patient reports new pain  Outcome: Progressing     Problem: INFECTION - ADULT  Goal: Absence or prevention of progression during hospitalization  Description: INTERVENTIONS:  - Assess and monitor for signs and symptoms of infection  - Monitor lab/diagnostic results  - Monitor all insertion sites, i e  indwelling lines, tubes, and drains  - Monitor endotracheal if appropriate and nasal secretions for changes in amount and color  - [place] appropriate cooling/warming therapies per order  - Administer medications as ordered  - Instruct and encourage patient and family to use good hand hygiene technique  - Identify and instruct in appropriate isolation precautions for identified infection/condition  Outcome: Progressing  Goal: Absence of fever/infection during neutropenic period  Description: INTERVENTIONS:  - Monitor WBC    Outcome: Progressing     Problem: SAFETY ADULT  Goal: Patient will remain free of falls  Description: INTERVENTIONS:  - Educate patient/family on patient safety including physical limitations  - Instruct patient to call for assistance with activity   - Consult OT/PT to assist with strengthening/mobility   - Keep Call bell within reach  - Keep bed low and locked with side rails adjusted as appropriate  - Keep care items and personal belongings within reach  - Initiate and maintain comfort rounds  - Make Fall Risk Sign visible to staff  - Offer Toileting every 2 Hours, in advance of need  - Initiate/Maintain bed alarm  - Obtain necessary fall risk management equipment  - Apply yellow socks and bracelet for high fall risk patients  - Consider moving patient to room near nurses station  Outcome: Progressing  Goal: Maintain or return to baseline ADL function  Description: INTERVENTIONS:  -  Assess patient's ability to carry out ADLs; assess patient's baseline for ADL function and identify physical deficits which impact ability to perform ADLs (bathing, care of mouth/teeth, toileting, grooming, dressing, etc )  - Assess/evaluate cause of self-care deficits   - Assess range of motion  - Assess patient's mobility; develop plan if impaired  - Assess patient's need for assistive devices and provide as appropriate  - Encourage maximum independence but intervene and supervise when necessary  - Involve family in performance of ADLs  - Assess for home care needs following discharge   - Consider OT consult to assist with ADL evaluation and planning for discharge  - Provide patient education as appropriate  Outcome: Progressing  Goal: Maintains/Returns to pre admission functional level  Description: INTERVENTIONS:  - Perform BMAT or MOVE assessment daily    - Set and communicate daily mobility goal to care team and patient/family/caregiver  - Collaborate with rehabilitation services on mobility goals if consulted  - Perform Range of Motion 3 times a day  - Reposition patient every 2 hours    - Dangle patient 3 times a day  - Stand patient 3 times a day  - Ambulate patient 3 times a day  - Out of bed to chair 3 times a day   - Out of bed for meals 3 times a day  - Out of bed for toileting  - Record patient progress and toleration of activity level   Outcome: Progressing     Problem: DISCHARGE PLANNING  Goal: Discharge to home or other facility with appropriate resources  Description: INTERVENTIONS:  - Identify barriers to discharge w/patient and caregiver  - Arrange for needed discharge resources and transportation as appropriate  - Identify discharge learning needs (meds, wound care, etc )  - Arrange for interpretive services to assist at discharge as needed  - Refer to Case Management Department for coordinating discharge planning if the patient needs post-hospital services based on physician/advanced practitioner order or complex needs related to functional status, cognitive ability, or social support system  Outcome: Progressing     Problem: Knowledge Deficit  Goal: Patient/family/caregiver demonstrates understanding of disease process, treatment plan, medications, and discharge instructions  Description: Complete learning assessment and assess knowledge base    Interventions:  - Provide teaching at level of understanding  - Provide teaching via preferred learning methods  Outcome: Progressing

## 2023-05-04 NOTE — ASSESSMENT & PLAN NOTE
Considering her prior history of stroke and elevated [id number] score recommend ongoing anticoagulation  If she is found to frequently have a low INR or if there is concern with ability to maintain a stable INR consideration could be given to transition to a direct oral anticoagulant    Otherwise it may be reasonable to bridge her with aspirin until her INR is therapeutic at the discretion of the primary medical team

## 2023-05-04 NOTE — ASSESSMENT & PLAN NOTE
"· Reporting tremor has been ongoing for \"months\"  · Per chart review , follows with neurology  · CTH showing moderate chronic small vessel changes  · Slight tremor bilateral hands on exam   No focal deficit    ·  continue outpatient follow-up with neurology and slightly decreased Paxil at this time  "

## 2023-05-18 ENCOUNTER — TELEPHONE (OUTPATIENT)
Dept: NEUROLOGY | Facility: CLINIC | Age: 78
End: 2023-05-18

## 2023-05-18 NOTE — TELEPHONE ENCOUNTER
1st attempt,     Called pt no answer, NO VM  Then called pt's daughter no answer LMOM     HFU/SL [place]/ MYOCLONUS     DISCHARGED ON 05/04/2023= [facility]: 05/018=     05/18/2023= Per Jez Garrido at the rehab ctr pt was discharged already home  Antonio Pall will need follow up in in 6 weeks with general attending or advance practitioner  She will not require outpatient neurological testing  Tori Torres 91 preferred please

## 2023-05-23 NOTE — TELEPHONE ENCOUNTER
2nd attempt,    No answer, NO VM  Letter sent  Pt was already discharged from rehab ctr notes below      Thank you,     Sagrario Mendoza

## 2023-06-22 ENCOUNTER — TELEPHONE (OUTPATIENT)
Dept: NEUROLOGY | Facility: CLINIC | Age: 78
End: 2023-06-22

## 2023-06-22 NOTE — TELEPHONE ENCOUNTER
Called and spoke to patient - confirmed upcoming appointment with Katheryn Malhotra on 07/03/23 3:45 pm at the [facility] office  Provided patient with apt date, time and location  Informed patient that check in is at least 15 minutes prior to apt time  The patient is not  having any issues or concerns at this time

## 2023-07-02 PROBLEM — N30.00 ACUTE CYSTITIS WITHOUT HEMATURIA: Status: RESOLVED | Noted: 2023-05-02 | Resolved: 2023-07-02

## (undated) DEVICE — GLOVE SRG BIOGEL 7.5

## (undated) DEVICE — RECHARGER PRGRMR THERAPY MANAGER RS2

## (undated) DEVICE — PREP SURGICAL PURPREP 26ML

## (undated) DEVICE — DRAPE C-ARM X-RAY

## (undated) DEVICE — VIAL DECANTER

## (undated) DEVICE — SUT SILK 2-0 SH 30 IN K833H

## (undated) DEVICE — TIBURON SPLIT SHEET: Brand: CONVERTORS

## (undated) DEVICE — CERVICAL COLLAR SOFT MED

## (undated) DEVICE — 3M™ IOBAN™ 2 ANTIMICROBIAL INCISE DRAPE 6650EZ: Brand: IOBAN™ 2

## (undated) DEVICE — TUBING SUCTION 5MM X 12 FT

## (undated) DEVICE — BETHLEHEM UNIVERSAL MINOR GEN: Brand: CARDINAL HEALTH

## (undated) DEVICE — SUT MONOCRYL 4-0 PS-2 27 IN Y426H

## (undated) DEVICE — SYRINGE EPI 8ML LUER SLIP LOSS OF RESISTANCE PLASTIC PERFIX

## (undated) DEVICE — Device

## (undated) DEVICE — PROGRAMMER PATIENT THERAPY MANAGER RS2

## (undated) DEVICE — SPONGE SCRUB 4 PCT CHLORHEXIDINE

## (undated) DEVICE — UTILITY MARKER,BLACK WITH LABELS: Brand: DEVON

## (undated) DEVICE — GLOVE SRG BIOGEL ECLIPSE 7

## (undated) DEVICE — GLOVE INDICATOR PI UNDERGLOVE SZ 6.5 BLUE

## (undated) DEVICE — BINDER ABDOMINAL 30-45 IN

## (undated) DEVICE — ELECTRODE BLADE MOD E-Z CLEAN 2.5IN 6.4CM -0012M

## (undated) DEVICE — 10FR FRAZIER SUCTION HANDLE: Brand: CARDINAL HEALTH

## (undated) DEVICE — PENCIL ELECTROSURG E-Z CLEAN -0035H

## (undated) DEVICE — DRAPE C-ARMOUR

## (undated) DEVICE — GLOVE INDICATOR PI UNDERGLOVE SZ 7.5 BLUE

## (undated) DEVICE — INTENDED FOR TISSUE SEPARATION, AND OTHER PROCEDURES THAT REQUIRE A SHARP SURGICAL BLADE TO PUNCTURE OR CUT.: Brand: BARD-PARKER SAFETY BLADES SIZE 10, STERILE

## (undated) DEVICE — PROGRAMMER EXTRNL WIRELESS NEURO STIM RS2

## (undated) DEVICE — ANTIBACTERIAL VIOLET BRAIDED (POLYGLACTIN 910), SYNTHETIC ABSORBABLE SUTURE: Brand: COATED VICRYL

## (undated) DEVICE — RX COUDÉ® EPIDURAL NEEDLE 14G TW X 4.0": Brand: EPIMED

## (undated) DEVICE — ADHESIVE SKIN HIGH VISCOSITY EXOFIN 1ML

## (undated) DEVICE — NEEDLE HYPO 22G X 1-1/2 IN

## (undated) DEVICE — GLOVE SRG BIOGEL 6.5